# Patient Record
Sex: MALE | Race: WHITE
[De-identification: names, ages, dates, MRNs, and addresses within clinical notes are randomized per-mention and may not be internally consistent; named-entity substitution may affect disease eponyms.]

---

## 2018-12-06 NOTE — DIAGNOSTIC IMAGING REPORT
EXAMINATION:  CHEST SINGLE (PORTABLE)    



INDICATION: Tachycardia.



COMPARISON:  11/1/09..

     

FINDINGS:

TUBES and LINES:  Right chest Port-A-Cath with distal tip projected on the

cavoatrial junction.



LUNGS:  Lungs are hypoinflated. Patchy density in the lower lobe suggestive of

subsegmental atelectasis. Mild prominence of the pulmonary vasculature

bilaterally.



PLEURA:  No pleural effusion or pneumothorax.



HEART AND MEDIASTINUM:  The cardiac silhouette is mildly enlarged.



BONES AND SOFT TISSUES: Status post bilateral shoulder arthroplasty.



UPPER ABDOMEN: No free air under the diaphragm. 



IMPRESSION: 



Bibasilar subsegmental atelectasis.



Cardiomegaly and mild pulmonary venous congestion.











Signed by: Dr. NINA Kelly M.D. on 12/6/2018 6:48 PM

## 2018-12-06 NOTE — XMS REPORT
Patient Summary Document

                             Created on: 2018



INDIA DELACRUZ

External Reference #: [id number]

: 1940

Sex: Male



Demographics







                          Address                   [address]

[place]  [address]

 

                          Home Phone                [phone]

 

                          Preferred Language        Unknown

 

                          Marital Status            Unknown

 

                          Synagogue Affiliation     Unknown

 

                          Race                      Unknown

 

                          Ethnic Group              Unknown





Author







                          Author                    [facility]

 

                          [facility]

 

                          Address                   Unknown

 

                          Phone                     Unavailable







Support







                Name            Relationship    Address         Phone

 

                    NUBIA  CRISTI    PRS                 [address]

[place]  [address]                      [phone]

 

                    CRISTI DELACRUZ    PRS                 [address]

[place]  [address]                      [phone]







Care Team Providers







                    Care Team Member Name    Role                Phone

 

                          Unavailable               Unavailable







Payers







             Payer Name    Policy Type    Policy Number    Effective Date    Expiration Date







Problems

This patient has no known problems.



Allergies, Adverse Reactions, Alerts







          Allergy Name    Allergy Type    Status    Severity    Reaction(s)    Onset Date    Inactive 

Date                      Treating Clinician        Comments

 

        Sulfa (Sulfonamide Antibiotics)    DA      Active                   2018-10-19 00:00:00                     

 

        clindamycin    DA      Active                  2018-10-19 00:00:00                     

 

        Sulfa (Sulfonamide Antibiotics)    DA      Active    U               2018-10-02 00:00:00                     

 

        clindamycin    DA      Active    SV              2018-01-15 00:00:00                     

 

        Sulfa (Sulfonamide Antibiotics)    DA      Active    U               2018-01-15 00:00:00                     

 

        zolpidem    DA      Active    SV              2018-01-15 00:00:00                     







Medications

This patient has no known medications.

## 2018-12-07 NOTE — CONSULTATION
DATE OF CONSULTATION:  December 06, 2018 



REASON FOR CONSULTATION:  AFib. 



HPI:  This is a pleasant 78-year-old male that presented with palpitations. 

 According to the patient, she started having rapid heartbeat in the 130s 

that he decided to come into the emergency room for evaluation.  He stated 

he has been compliant with his medications, avoided caffeine and does not 

really know what is going on.  He has a history of AFib and hypothyroidism. 

 He was recently admitted and discharged from [facility] in October 

after having a left shoulder surgery, and went to rehab and recovered real 

good, and was discharged home.  He denied any chest pain, any dizziness, 

any shortness of breath, or diaphoresis.  Troponin was negative.  EKG with 

AFib with RVR.  .  TSH 4.9.  He was started on an amiodarone drip 

from the emergency room, and heart rate is controlled now, but fluctuates.



PAST MEDICAL HISTORY:  Hypertension, right leg DVT, history of prostate 

cancer, history of cancer of sarcoma, rheumatoid arthritis, CKD, anemia, 

and diabetes.  



SURGICAL HISTORY:  Cholecystectomy, prostate surgery, left and right 

shoulder surgery.



FAMILY HISTORY:  Noncontributory.  



SOCIAL HISTORY:  He lives at home with the wife.  No smoking.  No drinking.



MEDICATIONS:  See med list.  



ALLERGIES:  HE HAS MULTIPLE ALLERGIES.  SEE CHART.



REVIEW OF SYSTEMS:  Negative except as mentioned above.  





PHYSICAL EXAMINATION 

VITAL SIGNS:  Temperature 98, heart rate 81, blood pressure 118/87, 

respirations 16, oxygen saturation 95% on room air. 

GENERAL:  He is awake, alert and oriented times 3. 

HEENT:  Mucous membrane moist. 

NECK:  Supple.  

LUNGS:  Bilateral clear to auscultation. 

CARDIOVASCULAR:  Irregularly irregular. 

ABDOMEN:   Soft. 

NEUROLOGICAL:  Intact. 

EXTREMITIES:  With no edema. 



LABS:  Sodium 137, potassium 4.1, chloride 108, CO2 20, BUN 26, creatinine 

1.67, glucose 91.  White blood cells 5.91, hemoglobin 10.6, hematocrit 

32.4, and platelets 218,0008.  PT 12.9, PTT 27.3 and INR 0.89.  



IMPRESSION 

1.  Atrial fibrillation. 

2.  Hypothyroidism.

3.  Anemia.

4.  Chronic kidney disease. 

5.  History of prostate cancer.

6.  Recent left shoulder surgery.  



ASSESSMENT AND PLAN

1.  He was started on an amiodarone drip.  Will continue the same and 

switch to p.o. tonight.  

2.  He was at [facility] last month and had a recent echo with 

reserved systolic function with EF 50% to 55%.  Will continue his home 

medications.  



Further cardiac workup pending clinical course.  



Thank you for this consultation.  He was anticoagulated and will continue 

the same also.  



DICTATED BY LAXMI DE LA ROSA NP





 





DD:  12/07/2018 08:11

DT:  12/07/2018 09:27

Job#:  [id number]

## 2018-12-07 NOTE — NUR
patient transferred to [facility] in stable condition. The receiving nurse Yumiko will continue the 
care of the patient. Patient was alert and oriented and vitals signs were: /86, HR98, 
RR18, 02 100% RA.

## 2018-12-07 NOTE — NUR
RECEIVED REPORT FROM SAMI RICK, PT TRANSFERRED TO  #188, PT AAOX3, BREATHING EVEN AND 
UNLABORED.PT MADE COMFORTABLE IN ROOM,

## 2018-12-07 NOTE — HISTORY AND PHYSICAL
CHIEF COMPLAINT:  Rapid heartbeat and shortness of breath.



HISTORY OF PRESENT ILLNESS:  This is a 78-year-old male with a past medical 

history of carcinoma of prostate, hypertension, DVT of the leg, recent left 

hip surgery, severe rheumatoid arthritis on prednisone 5 mg daily, who was 

in his usual state of health until the last 2 days he started developing 

some palpitation and increased blood pressure. Patient came to ER. ER shows 

AFib with rapid ventricular response at 121 per minute. No chest pain but 

has some shortness of breath. No bleeding per rectum, no hematemesis, no 

melena. No leg pain. Has left hip pain.



No backache. No burning on urination. No seizures. No focal weakness. No 

diarrhea, no constipation. 



PAST MEDICAL HISTORY

1. Hypertension.

2. Carcinoma of prostate.

3. DVT of lower extremity, chronic.

4. Severe rheumatoid arthritis.



PAST SURGICAL HISTORY:  History of left hip replacement.



ALLERGY:  ALLERGIC TO SULFA AND CLINDAMYCIN.



SOCIAL HISTORY:  Patient , lives with his wife.



HABITS:  Denies smoking, denies alcohol use, denies illicit drug use.



MEDICATION:  List attached.



REVIEW OF SYSTEMS

CONSTITUTIONAL:  Generalized fatigue and weakness.

HEENT:  No diplopia. No blurring of vision.

CARDIOPULMONARY:  No chest pain. Has some palpitation and shortness of 

breath.

ALIMENTARY:  No nausea, no vomiting, no diarrhea, no constipation.

GENITOURINARY:  Has no dysuria, no hematuria.

MUSCULOSKELETAL:  No joint pains.

CENTRAL NERVOUS SYSTEM:  No focal weakness.



PHYSICAL EXAMINATION

GENERAL:  A 78-year-old male who is alert, oriented x3. No gross or acute 

respiratory distress.

VITAL SIGNS:  Temperature 98.2, pulse rate 110 per minute, respiratory rate 

18, blood pressure 104/77.

HEENT:  Head atraumatic, normocephalic. Pupils are bilaterally equal and 

reactive to light.  Extraocular muscles intact.

NECK:  Supple. No JVD. No carotid bruit. 

SKIN:  Dry.

LUNGS:  Clear to auscultation and percussion bilaterally. No added sound.

HEART:  S1 and S2. Irregularly irregular. No S3, no S4, no murmur.

ABDOMEN:  Soft, nontender. No guarding, no rigidity.

EXTREMITIES:  No pedal edema. Peripheral pulses +1.

MUSCULOSKELETAL:  Has joint deformity seen.

CENTRAL NERVOUS SYSTEM:  Grossly nonfocal. 



Chest x-ray:  Cardiomegaly, mild congestion. White count 8.23, hemoglobin 

11.9, hematocrit 38.1, platelet 260. Chemistry:  Sodium 138, potassium 4.3, 

BUN 30, creatinine 2.03. LFTs:  AST 48. TSH 4.972. . CK, CK-MB, 

troponin negative. EKG:  AFib with new AFib with a rapid ventricular 

response at 121 per minute.



ASSESSMENT

1. Atrial fibrillation with rapid ventricular response.

2. Mild congestive heart failure.

3. Hypertension.

4. Chronic deep vein thrombosis.

5. History of rheumatoid arthritis.

6. Left hip replacement.



PLAN:   Admit to ICU. IV amiodarone drip. Cardiology consult, Dr. Thompson. 

Continue amlodipine 5 daily, metoprolol 25 b.i.d. Case discussed with Dr. Thompson. Continue Eliquis 2.5 b.i.d. Lab tomorrow, BMP. 











DD:  12/07/2018 16:36

DT:  12/07/2018 16:48

Job#:  [id number]

## 2018-12-08 NOTE — NUR
Nutrition Screen Note



RD Recommendation for Physician: Continue diet as ordered 



Plan of Care: RD following, monitoring for adequacy and tolerance



Nutrition reason for involvement:

MD Consult



Primary Diagnose(s): atrial fibrillation with RVR 



Ht: 62in 

Wt:130.44lbs

BMI:23.9 kg/m2

IBW:118lbs



RD Assessment:(12/8/2018)

Initial encounter with patient. Diet Hx: Pt has no known food allergies. Medications: MAR 
reviewed. Physical activity and function: pt is able to feed himself. Nutrition - focused 
physical findings: no N, V, D, some biting chewing difficulty with beef, but does not desire 
a texture modification. Pt avoids eating eggs.

Current Diet: cardiac diet 



Malnutrition Evaluation (12/8/2018)

The patient does not meet criteria for a specified degree of malnutrition at this time. Will 
re-evaluate at follow-up as appropriate. 







Diet Education Needs Assessment:

Diet education not indicated.







Diet Adequacy:

Meeting calorie needs, Meeting protein needs, Meeting fluid needs



Tolerance:  

Tolerating PO



Nutrition Care Level: Rj Pabon RD, LD, [facility]

## 2018-12-08 NOTE — NUR
NO CHANGE IN PT'S STATUS, PT RESTING IN BED WITH EYES CLOSED BREATHING EVEN AND UNLABORED. 
WILL REPORT OFF TO COLTEN RICK.

## 2018-12-08 NOTE — NUR
REPORT GIVEN BY COLTEN RICK, BOTH NURSES GAVE REPORT AT PT'S BEDSIDE. PT SITTING UP ON THE SIDE 
OF THE BED, STATES HE'S NAUSEATED, INFORMED HIM THIS NURSE WILL MEDICATE HIM WITH PRN 
ZOFRAN. PT STATES OK.

## 2018-12-09 NOTE — NUR
just transferred patient to  103, nurse Angeline RICK received the reports. he is stable, 
awake alert oriented, telemetry no 2402 is on. vitals just checked prior transferred;within 
normal level.

## 2018-12-09 NOTE — NUR
pt nauseated and vomiting, medicated with zofran 4mg prn dose, pt given cold cloth for his 
head, and informed pt nurse will be back to check on him, pt verbalized understanding, call 
bell in reach.

## 2018-12-09 NOTE — NUR
PT RESTING IN BED, MEDICATED WITH NORCO 10MG FOR CHRONIC PAIN, INSTRUCTED PT TO RELAX, PT 
STATED HE WOULD TRY.INFORMED HIM NURSE WILL CHECK ON HIM AND PLEASE NOTIFY NURSE IF THERES 
ANYTHING I COULD DO FOR HIM

## 2018-12-09 NOTE — NUR
patient resting with closed eyes, just medicated with Norco for back pain and Zofran for 
nausea earlier, vitals checked, no distress noted, bed alarm is on will continue monitoring.

## 2018-12-09 NOTE — NUR
Patient ok to go to floor from Cardiology stand point per Dr. THU Valdes. RN spoke to Dr. SUJATA Koch and obtained floor orders with telemetry.

## 2018-12-09 NOTE — NUR
Complete bed bath given, pt tolerated well, medicated for generalized pain. call bell in 
reach, instructed pt to call when necessary. pt verbalized understanding

## 2018-12-10 NOTE — NUR
spoke with md elena regarding discharge . md miranda discharge , reviewed bp meds at this time 
(adjusting medication orders ) given, 

md perez rounded states pt can go home when pt family brings bottles of medicine to show 
nurse prior to dc. orders received

## 2018-12-10 NOTE — NUR
RECEIVED PATIENT AAOX3, RESTING IN BED. STABLE CONDITION. DENIES ANY NEEDS. BED LOCKED AND 
IN LOWEST POSITION. CALL LIGHT WITHIN EASY REACH. WILL CONTINUE TO MONITOR THE PATIENT 
CLOSELY.

## 2018-12-11 NOTE — NUR
pt sitting up at edge of bed aa0x3. pt is in no s.s of distress. pt is sl to the L fa 22. 
site is clean and dry. pt aware of stool sample collection for today. will mg orr 
for plans in terms of EGD procedure planned

## 2018-12-11 NOTE — NUR
consent for EGD signed and placed to chart. patient education printouts provided to patient. 
bed locked, and in lowest position, call light within easy reach, and bed alarm activated.

## 2018-12-12 NOTE — NUR
WALKING ROUNDS PERFORMED, RECEIVED PT LAYING SEMI FOWLERS IN BED, AAOX3, RR EVEN AND 
NON-LABORED, ON RA. NO S/SX OF DISTRESS NOTED. LEFT PT LAYING SEMI FOWLERS IN BED, BED IN 
LOW LOCKED POSITION, SIDE RAILS UPX2, CALL LIGHT AND PHONE WITHIN REACH.

## 2018-12-12 NOTE — NUR
Nutrition Screen Note

RD Recommendation for Physician:

-Continue cardiac diet as ordered

-Consider Ensure Compact BID if PO <50%   



Plan of Care: RD following, monitoring for adequacy and tolerance



Nutrition reason for involvement:

Follow up 



Primary Diagnose(s): atrial fibrillation with RVR 



PMHx: carcinoma of prostate, hypertension, DVT of the leg, recent left hip surgery, severe 
rheumatoid arthritis



Ht: 62in 

Wt: 130.44lbs  12/6; 116.12lb  12/10 (possible inaccurate weight obtained)     

BMI:23.9kg/m2

IBW: 118lbs     



RD Assessment:

12/12  Chart reviewed.  Pt had colonoscopy today with some polyps removed. During my visit, 
pt reports improved appetite with ~% recorded PO intake. No GI complains noted. LBM  
12/11, black stool per RN. GI is following. Pt has had some weight loss last year but weight 
remains stable this year at 123lb. No swallowing difficulty noted. Will continue to monitor 
and follow. 

(12/8/2018) Initial encounter with patient. Diet Hx: Pt has no known food allergies. 
Medications: MAR reviewed. Physical activity and function: pt is able to feed himself. 
Nutrition - focused physical findings: no N, V, D, some biting chewing difficulty with beef, 
but does not desire a texture modification. Pt avoids eating eggs.



Current Diet: cardiac diet 



Malnutrition Evaluation (12/8/2018)

The patient does not meet criteria for a specified degree of malnutrition at this time. Will 
re-evaluate at follow-up as appropriate. 



Diet Education Needs Assessment:

Diet education not indicated.



Diet Adequacy:

Meeting calorie needs, Meeting protein needs, Meeting fluid needs



Tolerance:  

Tolerating PO



Nutrition Care Level: Low 



Signed by Angeline Troy, MS, RD, LD

## 2018-12-12 NOTE — NUR
MEDICATED PER MD ORDER FOR PAIN 8/10 BACK, TOLERATING PO AT THIS TIME, EDUCATED TO CALL 
BEFORE GETTING OUT OF BED, PT VERBALIZED UNDERSTANDING, CALL LIGHT WITHIN REACH

## 2018-12-12 NOTE — OPERATIVE REPORT
DATE OF PROCEDURE:  December 12, 2018 



REFERRING PHYSICIAN:  Dr. SUJATA Acosta.  



PROCEDURE PERFORMED:  Esophagogastroduodenoscopy.  



INDICATIONS FOR PROCEDURE:  History of melena, nausea and vomiting.



MEDICATION:  Patient was done under MAC.  Please see anesthesiologist's 

note.



PROCEDURE:  With patient in left lateral decubitus position, the flexible 

fiberoptic Olympus gastroscope was introduced into the esophagus under 

direct visualization without any difficulty.  There was some patchy 

erythema noted in distal esophagus.  The scope was then advanced with ease 

into the stomach and the patient appears to be status post Billroth II.  

Anastomosis was intact and both efferent and afferent loops.  Gastric stump 

polyps, somewhat large, x2 were removed per snare electrocautery.  There 

was also some patchy intense erythema noted in the gastric stump and 

biopsies were obtained.  The scope was then retroflexed into the gastric 

stump and the fundus and the cardia appeared to be within normal limits.  

The scope was then straightened out, was subsequently withdrawn.  Patient 

tolerated the procedure well.



IMPRESSIONS  

1. Mild distal esophagitis.

2. Status post Billroth II, anastomosis intact.



3. Large gastric stump polyps x2, removed per snare electrocautery.

4. Gastric stump gastritis, biopsied.



PLAN:  Follow up histology.  Add Carafate 1 g p.o. a.c. t.i.d. and nightly. 

 







DD:  12/12/2018 12:14

DT:  12/12/2018 12:30

Job#:  [id number]



cc:ZENA ACOSTA MD, [phone]

## 2018-12-13 NOTE — NUR
Walking rounds completed and th pt. is currently sleeping. The siderails are up times two 
and alarms are activated.

## 2019-09-16 LAB
ANION GAP SERPL CALC-SCNC: 12.8 MMOL/L (ref 8–16)
BASOPHILS # BLD AUTO: 0 10*3/UL (ref 0–0.1)
BASOPHILS NFR BLD AUTO: 0.5 % (ref 0–1)
BUN SERPL-MCNC: 32 MG/DL (ref 7–26)
BUN/CREAT SERPL: 17 (ref 6–25)
CALCIUM SERPL-MCNC: 8.8 MG/DL (ref 8.4–10.2)
CHLORIDE SERPL-SCNC: 101 MMOL/L (ref 98–107)
CO2 SERPL-SCNC: 27 MMOL/L (ref 22–29)
DEPRECATED NEUTROPHILS # BLD AUTO: 5.8 10*3/UL (ref 2.1–6.9)
EGFRCR SERPLBLD CKD-EPI 2021: 35 ML/MIN (ref 60–?)
EOSINOPHIL # BLD AUTO: 0.2 10*3/UL (ref 0–0.4)
EOSINOPHIL NFR BLD AUTO: 2.1 % (ref 0–6)
ERYTHROCYTE [DISTWIDTH] IN CORD BLOOD: 15 % (ref 11.7–14.4)
GLUCOSE SERPLBLD-MCNC: 112 MG/DL (ref 74–118)
HCT VFR BLD AUTO: 31.7 % (ref 38.2–49.6)
HGB BLD-MCNC: 9.5 G/DL (ref 14–18)
LYMPHOCYTES # BLD: 1.3 10*3/UL (ref 1–3.2)
LYMPHOCYTES NFR BLD AUTO: 16.2 % (ref 18–39.1)
MCH RBC QN AUTO: 24.7 PG (ref 28–32)
MCHC RBC AUTO-ENTMCNC: 30 G/DL (ref 31–35)
MCV RBC AUTO: 82.6 FL (ref 81–99)
MONOCYTES # BLD AUTO: 0.8 10*3/UL (ref 0.2–0.8)
MONOCYTES NFR BLD AUTO: 10.3 % (ref 4.4–11.3)
NEUTS SEG NFR BLD AUTO: 70.5 % (ref 38.7–80)
PLATELET # BLD AUTO: 336 X10E3/UL (ref 140–360)
POTASSIUM SERPL-SCNC: 3.8 MMOL/L (ref 3.5–5.1)
RBC # BLD AUTO: 3.84 X10E6/UL (ref 4.3–5.7)
SODIUM SERPL-SCNC: 137 MMOL/L (ref 136–145)

## 2019-09-16 NOTE — DIAGNOSTIC IMAGING REPORT
EXAMINATION:  CHEST 2 VIEWS    



INDICATION: Pre-operative



COMPARISON: Chest radiograph of 12/6/2018

     

FINDINGS:



LINES/TUBES:Right chest subclavian port with catheter tip in the superior vena

cava.



LUNGS:The lungs are well-inflated. No focal consolidation or pulmonary edema.

Mild left basilar subsegmental atelectasis.



PLEURA:No pleural effusion or pneumothorax.



MEDIASTINUM:The cardiomediastinal silhouette appears normal in size and shape.



BONES/SOFT TISSUES:No acute osseous injury. Status post right and left total

shoulder replacements. Diffuse osteopenia of the spine.



ABDOMEN:No free air under the diaphragm. Status post cholecystectomy.





IMPRESSION: 

No focal pneumonia or pulmonary edema.



Signed by: Destiney Rivera MD on 9/16/2019 11:46 AM

## 2019-09-18 ENCOUNTER — HOSPITAL ENCOUNTER (OUTPATIENT)
Dept: HOSPITAL 88 - OR | Age: 79
Discharge: HOME | End: 2019-09-18
Attending: SURGERY
Payer: MEDICARE

## 2019-09-18 VITALS — SYSTOLIC BLOOD PRESSURE: 115 MMHG | DIASTOLIC BLOOD PRESSURE: 90 MMHG

## 2019-09-18 DIAGNOSIS — Z01.818: ICD-10-CM

## 2019-09-18 DIAGNOSIS — C61: ICD-10-CM

## 2019-09-18 DIAGNOSIS — Z88.2: ICD-10-CM

## 2019-09-18 DIAGNOSIS — Z01.810: ICD-10-CM

## 2019-09-18 DIAGNOSIS — Z79.02: ICD-10-CM

## 2019-09-18 DIAGNOSIS — C44.40: Primary | ICD-10-CM

## 2019-09-18 DIAGNOSIS — Z88.1: ICD-10-CM

## 2019-09-18 DIAGNOSIS — Z86.73: ICD-10-CM

## 2019-09-18 DIAGNOSIS — I48.91: ICD-10-CM

## 2019-09-18 DIAGNOSIS — Z01.812: ICD-10-CM

## 2019-09-18 DIAGNOSIS — Z86.718: ICD-10-CM

## 2019-09-18 DIAGNOSIS — L98.491: ICD-10-CM

## 2019-09-18 DIAGNOSIS — I10: ICD-10-CM

## 2019-09-18 DIAGNOSIS — M06.9: ICD-10-CM

## 2019-09-18 PROCEDURE — 80048 BASIC METABOLIC PNL TOTAL CA: CPT

## 2019-09-18 PROCEDURE — 88307 TISSUE EXAM BY PATHOLOGIST: CPT

## 2019-09-18 PROCEDURE — 85025 COMPLETE CBC W/AUTO DIFF WBC: CPT

## 2019-09-18 PROCEDURE — 36415 COLL VENOUS BLD VENIPUNCTURE: CPT

## 2019-09-18 PROCEDURE — 71046 X-RAY EXAM CHEST 2 VIEWS: CPT

## 2019-09-18 PROCEDURE — 93005 ELECTROCARDIOGRAM TRACING: CPT

## 2019-09-18 PROCEDURE — 14020 TIS TRNFR S/A/L 10 SQ CM/<: CPT

## 2019-09-18 NOTE — OPERATIVE REPORT
DATE OF PROCEDURE:  09/18/2019

 

SURGEON:  Juancarlos Longo MD

 

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

 

OPERATION PERFORMED:  Wide excision of ulcerated cancer of the left parietal scalp with

rotational flap closure. 

 

ANESTHESIA:  General.

 

COMPLICATIONS:  None.

 

ESTIMATED BLOOD LOSS:  25 mL.

 

DESCRIPTION OF PROCEDURE:  With the patient lying in bed in the supine position, under

good general endotracheal anesthesia, the scalp and forehead were prepped with Betadine

solution and draped in the usual manner.  The patient had a lesion that was about 3 cm x

3 cm in size with part of it ulcerated.  A wide elliptical incision was then made to

include the entire lesion with normal appearing skin all the way around.  Incision was

deepened through the subcutaneous tissue and all the way down to the bone and the lesion

was totally and completely removed, and sent for pathological examination after

appropriate marking.  Hemostasis was then ascertained.  The whole area was then

infiltrated with 0.25% Marcaine with epinephrine.  Wide flaps then had to be developed

all the way down to the ear on the one side and to the lateral aspect of the scalp on

the right side, and all the way around in order to be able to mobilize enough skin.  The

skin did reach, although with some tension and the wound was then closed with

interrupted vertical mattress sutures of 2-0 nylon.  Pressure dressing was applied.  The

sponge, lap, and needle counts were correct.  The patient tolerated the procedure well

and returned to the recovery room in stable condition. 

 

 

 

 

______________________________

MD ANANTH Meehan/MODL

D:  09/18/2019 12:22:13

T:  09/18/2019 13:14:25

Job #:  [id number]

## 2019-09-26 ENCOUNTER — HOSPITAL ENCOUNTER (INPATIENT)
Dept: HOSPITAL 88 - IMCU | Age: 79
LOS: 7 days | Discharge: HOME HEALTH | DRG: 193 | End: 2019-10-03
Attending: INTERNAL MEDICINE | Admitting: INTERNAL MEDICINE
Payer: MEDICARE

## 2019-09-26 VITALS — DIASTOLIC BLOOD PRESSURE: 89 MMHG | SYSTOLIC BLOOD PRESSURE: 117 MMHG

## 2019-09-26 VITALS — SYSTOLIC BLOOD PRESSURE: 116 MMHG | DIASTOLIC BLOOD PRESSURE: 98 MMHG

## 2019-09-26 VITALS — DIASTOLIC BLOOD PRESSURE: 101 MMHG | SYSTOLIC BLOOD PRESSURE: 120 MMHG

## 2019-09-26 VITALS — HEIGHT: 62 IN | BODY MASS INDEX: 22.52 KG/M2 | WEIGHT: 122.37 LBS

## 2019-09-26 VITALS — DIASTOLIC BLOOD PRESSURE: 98 MMHG | SYSTOLIC BLOOD PRESSURE: 116 MMHG

## 2019-09-26 VITALS — SYSTOLIC BLOOD PRESSURE: 134 MMHG | DIASTOLIC BLOOD PRESSURE: 99 MMHG

## 2019-09-26 VITALS — DIASTOLIC BLOOD PRESSURE: 80 MMHG | SYSTOLIC BLOOD PRESSURE: 119 MMHG

## 2019-09-26 VITALS — DIASTOLIC BLOOD PRESSURE: 71 MMHG | SYSTOLIC BLOOD PRESSURE: 95 MMHG

## 2019-09-26 VITALS — SYSTOLIC BLOOD PRESSURE: 125 MMHG | DIASTOLIC BLOOD PRESSURE: 95 MMHG

## 2019-09-26 VITALS — DIASTOLIC BLOOD PRESSURE: 88 MMHG | SYSTOLIC BLOOD PRESSURE: 134 MMHG

## 2019-09-26 VITALS — DIASTOLIC BLOOD PRESSURE: 94 MMHG | SYSTOLIC BLOOD PRESSURE: 111 MMHG

## 2019-09-26 VITALS — DIASTOLIC BLOOD PRESSURE: 96 MMHG | SYSTOLIC BLOOD PRESSURE: 120 MMHG

## 2019-09-26 VITALS — SYSTOLIC BLOOD PRESSURE: 120 MMHG | DIASTOLIC BLOOD PRESSURE: 102 MMHG

## 2019-09-26 DIAGNOSIS — M06.9: ICD-10-CM

## 2019-09-26 DIAGNOSIS — J96.01: ICD-10-CM

## 2019-09-26 DIAGNOSIS — E77.8: ICD-10-CM

## 2019-09-26 DIAGNOSIS — G89.4: ICD-10-CM

## 2019-09-26 DIAGNOSIS — I48.91: ICD-10-CM

## 2019-09-26 DIAGNOSIS — I13.0: ICD-10-CM

## 2019-09-26 DIAGNOSIS — Z79.01: ICD-10-CM

## 2019-09-26 DIAGNOSIS — D50.9: ICD-10-CM

## 2019-09-26 DIAGNOSIS — N18.9: ICD-10-CM

## 2019-09-26 DIAGNOSIS — J18.9: Primary | ICD-10-CM

## 2019-09-26 DIAGNOSIS — I82.503: ICD-10-CM

## 2019-09-26 DIAGNOSIS — E87.1: ICD-10-CM

## 2019-09-26 DIAGNOSIS — I50.42: ICD-10-CM

## 2019-09-26 LAB
ALBUMIN SERPL-MCNC: 3.3 G/DL (ref 3.5–5)
ALBUMIN/GLOB SERPL: 1.4 {RATIO} (ref 0.8–2)
ALP SERPL-CCNC: 99 IU/L (ref 40–150)
ALT SERPL-CCNC: 22 IU/L (ref 0–55)
ANION GAP SERPL CALC-SCNC: 12.4 MMOL/L (ref 8–16)
BASOPHILS # BLD AUTO: 0 10*3/UL (ref 0–0.1)
BASOPHILS NFR BLD AUTO: 0.5 % (ref 0–1)
BUN SERPL-MCNC: 33 MG/DL (ref 7–26)
BUN/CREAT SERPL: 16 (ref 6–25)
CALCIUM SERPL-MCNC: 8.7 MG/DL (ref 8.4–10.2)
CHLORIDE SERPL-SCNC: 94 MMOL/L (ref 98–107)
CO2 SERPL-SCNC: 29 MMOL/L (ref 22–29)
DEPRECATED NEUTROPHILS # BLD AUTO: 6.6 10*3/UL (ref 2.1–6.9)
EGFRCR SERPLBLD CKD-EPI 2021: 32 ML/MIN (ref 60–?)
EOSINOPHIL # BLD AUTO: 0 10*3/UL (ref 0–0.4)
EOSINOPHIL NFR BLD AUTO: 0.5 % (ref 0–6)
ERYTHROCYTE [DISTWIDTH] IN CORD BLOOD: 15.2 % (ref 11.7–14.4)
GLOBULIN PLAS-MCNC: 2.4 G/DL (ref 2.3–3.5)
GLUCOSE SERPLBLD-MCNC: 140 MG/DL (ref 74–118)
HCT VFR BLD AUTO: 29.8 % (ref 38.2–49.6)
HGB BLD-MCNC: 8.8 G/DL (ref 14–18)
LYMPHOCYTES # BLD: 0.8 10*3/UL (ref 1–3.2)
LYMPHOCYTES NFR BLD AUTO: 9.8 % (ref 18–39.1)
MAGNESIUM SERPL-MCNC: 2.3 MG/DL (ref 1.3–2.1)
MCH RBC QN AUTO: 23.8 PG (ref 28–32)
MCHC RBC AUTO-ENTMCNC: 29.5 G/DL (ref 31–35)
MCV RBC AUTO: 80.8 FL (ref 81–99)
MONOCYTES # BLD AUTO: 0.7 10*3/UL (ref 0.2–0.8)
MONOCYTES NFR BLD AUTO: 8.1 % (ref 4.4–11.3)
NEUTS SEG NFR BLD AUTO: 80.9 % (ref 38.7–80)
PLATELET # BLD AUTO: 299 X10E3/UL (ref 140–360)
POTASSIUM SERPL-SCNC: 3.4 MMOL/L (ref 3.5–5.1)
RBC # BLD AUTO: 3.69 X10E6/UL (ref 4.3–5.7)
SODIUM SERPL-SCNC: 132 MMOL/L (ref 136–145)

## 2019-09-26 PROCEDURE — 71045 X-RAY EXAM CHEST 1 VIEW: CPT

## 2019-09-26 PROCEDURE — 85025 COMPLETE CBC W/AUTO DIFF WBC: CPT

## 2019-09-26 PROCEDURE — 97139 UNLISTED THERAPEUTIC PX: CPT

## 2019-09-26 PROCEDURE — 93312 ECHO TRANSESOPHAGEAL: CPT

## 2019-09-26 PROCEDURE — 93325 DOPPLER ECHO COLOR FLOW MAPG: CPT

## 2019-09-26 PROCEDURE — 71046 X-RAY EXAM CHEST 2 VIEWS: CPT

## 2019-09-26 PROCEDURE — 36415 COLL VENOUS BLD VENIPUNCTURE: CPT

## 2019-09-26 PROCEDURE — 82805 BLOOD GASES W/O2 SATURATION: CPT

## 2019-09-26 PROCEDURE — 93320 DOPPLER ECHO COMPLETE: CPT

## 2019-09-26 PROCEDURE — 83880 ASSAY OF NATRIURETIC PEPTIDE: CPT

## 2019-09-26 PROCEDURE — 93005 ELECTROCARDIOGRAM TRACING: CPT

## 2019-09-26 PROCEDURE — 80053 COMPREHEN METABOLIC PANEL: CPT

## 2019-09-26 PROCEDURE — 84484 ASSAY OF TROPONIN QUANT: CPT

## 2019-09-26 PROCEDURE — 36600 WITHDRAWAL OF ARTERIAL BLOOD: CPT

## 2019-09-26 PROCEDURE — 71250 CT THORAX DX C-: CPT

## 2019-09-26 PROCEDURE — 83735 ASSAY OF MAGNESIUM: CPT

## 2019-09-26 PROCEDURE — 80048 BASIC METABOLIC PNL TOTAL CA: CPT

## 2019-09-26 RX ADMIN — CEFTRIAXONE SCH MLS/HR: 2 INJECTION, SOLUTION INTRAVENOUS at 22:29

## 2019-09-26 RX ADMIN — METOPROLOL TARTRATE SCH MG: 25 TABLET, FILM COATED ORAL at 20:43

## 2019-09-26 RX ADMIN — SODIUM CHLORIDE SCH MLS/HR: 900 INJECTION, SOLUTION INTRAVENOUS at 21:10

## 2019-09-26 RX ADMIN — HYDROCODONE BITARTRATE AND ACETAMINOPHEN PRN EA: 10; 325 TABLET ORAL at 19:50

## 2019-09-26 RX ADMIN — FAMOTIDINE SCH MG: 10 INJECTION, SOLUTION INTRAVENOUS at 21:10

## 2019-09-26 RX ADMIN — APIXABAN SCH MG: 5 TABLET, FILM COATED ORAL at 17:14

## 2019-09-26 NOTE — DIAGNOSTIC IMAGING REPORT
A single frontal view of the chest.



HISTORY:  Shortness of breath, A. fib

COMPARISON:  Chest radiograph September 16, 2019.

     

DISCUSSION:

Portable technique,  limits sensitivity of the exam.

Multiple overlying artifacts.

Tubes/Lines:  Unchanged appearance of the right chest port.



Lungs and pleura:  

Low lung volumes result in bibasilar vascular crowding, accentuation of the

pulmonary interstitial markings, central pulmonary vasculature, and the cardiac

silhouette.  Allowing for these limitations, the findings are as follows:  

Mild patchy retrocardiac opacity.

No definite pleural effusion or pneumothorax is identified.



Heart and mediastinum:  

The cardiomediastinal silhouette appear(s) unchanged.



Bones and soft tissues:  

Bilateral total shoulder arthroplasties.





IMPRESSION: 

Patchy retrocardiac opacity, may reflect atelectasis, pneumonia, and/or

aspiration in the appropriate setting. Recommend short term follow up routine

PA and lateral chest radiographs, in 6 to 8 weeks, to evaluate for resolution.











Signed by: Dr. Chuy Hopkins D.O., M.M.M. on 9/26/2019 7:36 PM

## 2019-09-27 VITALS — SYSTOLIC BLOOD PRESSURE: 94 MMHG | DIASTOLIC BLOOD PRESSURE: 73 MMHG

## 2019-09-27 VITALS — DIASTOLIC BLOOD PRESSURE: 68 MMHG | SYSTOLIC BLOOD PRESSURE: 92 MMHG

## 2019-09-27 VITALS — SYSTOLIC BLOOD PRESSURE: 94 MMHG | DIASTOLIC BLOOD PRESSURE: 77 MMHG

## 2019-09-27 VITALS — DIASTOLIC BLOOD PRESSURE: 98 MMHG | SYSTOLIC BLOOD PRESSURE: 122 MMHG

## 2019-09-27 VITALS — DIASTOLIC BLOOD PRESSURE: 71 MMHG | SYSTOLIC BLOOD PRESSURE: 95 MMHG

## 2019-09-27 VITALS — SYSTOLIC BLOOD PRESSURE: 104 MMHG | DIASTOLIC BLOOD PRESSURE: 80 MMHG

## 2019-09-27 VITALS — DIASTOLIC BLOOD PRESSURE: 88 MMHG | SYSTOLIC BLOOD PRESSURE: 108 MMHG

## 2019-09-27 VITALS — DIASTOLIC BLOOD PRESSURE: 98 MMHG | SYSTOLIC BLOOD PRESSURE: 116 MMHG

## 2019-09-27 VITALS — DIASTOLIC BLOOD PRESSURE: 69 MMHG | SYSTOLIC BLOOD PRESSURE: 89 MMHG

## 2019-09-27 VITALS — DIASTOLIC BLOOD PRESSURE: 69 MMHG | SYSTOLIC BLOOD PRESSURE: 95 MMHG

## 2019-09-27 VITALS — DIASTOLIC BLOOD PRESSURE: 61 MMHG | SYSTOLIC BLOOD PRESSURE: 97 MMHG

## 2019-09-27 VITALS — SYSTOLIC BLOOD PRESSURE: 101 MMHG | DIASTOLIC BLOOD PRESSURE: 79 MMHG

## 2019-09-27 VITALS — DIASTOLIC BLOOD PRESSURE: 79 MMHG | SYSTOLIC BLOOD PRESSURE: 105 MMHG

## 2019-09-27 VITALS — SYSTOLIC BLOOD PRESSURE: 97 MMHG | DIASTOLIC BLOOD PRESSURE: 72 MMHG

## 2019-09-27 VITALS — SYSTOLIC BLOOD PRESSURE: 100 MMHG | DIASTOLIC BLOOD PRESSURE: 78 MMHG

## 2019-09-27 VITALS — SYSTOLIC BLOOD PRESSURE: 85 MMHG | DIASTOLIC BLOOD PRESSURE: 66 MMHG

## 2019-09-27 VITALS — DIASTOLIC BLOOD PRESSURE: 76 MMHG | SYSTOLIC BLOOD PRESSURE: 103 MMHG

## 2019-09-27 VITALS — DIASTOLIC BLOOD PRESSURE: 86 MMHG | SYSTOLIC BLOOD PRESSURE: 107 MMHG

## 2019-09-27 VITALS — DIASTOLIC BLOOD PRESSURE: 66 MMHG | SYSTOLIC BLOOD PRESSURE: 94 MMHG

## 2019-09-27 VITALS — SYSTOLIC BLOOD PRESSURE: 104 MMHG | DIASTOLIC BLOOD PRESSURE: 78 MMHG

## 2019-09-27 VITALS — DIASTOLIC BLOOD PRESSURE: 76 MMHG | SYSTOLIC BLOOD PRESSURE: 97 MMHG

## 2019-09-27 VITALS — DIASTOLIC BLOOD PRESSURE: 55 MMHG | SYSTOLIC BLOOD PRESSURE: 98 MMHG

## 2019-09-27 VITALS — SYSTOLIC BLOOD PRESSURE: 93 MMHG | DIASTOLIC BLOOD PRESSURE: 69 MMHG

## 2019-09-27 VITALS — DIASTOLIC BLOOD PRESSURE: 84 MMHG | SYSTOLIC BLOOD PRESSURE: 105 MMHG

## 2019-09-27 LAB
ANION GAP SERPL CALC-SCNC: 9.7 MMOL/L (ref 8–16)
BASE EXCESS BLDA CALC-SCNC: 3 MMOL/L (ref -2–3)
BASOPHILS # BLD AUTO: 0.1 10*3/UL (ref 0–0.1)
BASOPHILS NFR BLD AUTO: 0.7 % (ref 0–1)
BUN SERPL-MCNC: 31 MG/DL (ref 7–26)
BUN/CREAT SERPL: 15 (ref 6–25)
CALCIUM SERPL-MCNC: 8.1 MG/DL (ref 8.4–10.2)
CHLORIDE SERPL-SCNC: 96 MMOL/L (ref 98–107)
CO2 SERPL-SCNC: 28 MMOL/L (ref 22–29)
DEPRECATED NEUTROPHILS # BLD AUTO: 4.8 10*3/UL (ref 2.1–6.9)
EGFRCR SERPLBLD CKD-EPI 2021: 31 ML/MIN (ref 60–?)
EOSINOPHIL # BLD AUTO: 0.2 10*3/UL (ref 0–0.4)
EOSINOPHIL NFR BLD AUTO: 2.1 % (ref 0–6)
ERYTHROCYTE [DISTWIDTH] IN CORD BLOOD: 15.2 % (ref 11.7–14.4)
GLUCOSE SERPLBLD-MCNC: 100 MG/DL (ref 74–118)
HCO3 BLDA-SCNC: 27 MMOL/L (ref 23–28)
HCT VFR BLD AUTO: 24.8 % (ref 38.2–49.6)
HGB BLD-MCNC: 7.5 G/DL (ref 14–18)
LYMPHOCYTES # BLD: 1.2 10*3/UL (ref 1–3.2)
LYMPHOCYTES NFR BLD AUTO: 16.5 % (ref 18–39.1)
MCH RBC QN AUTO: 24.3 PG (ref 28–32)
MCHC RBC AUTO-ENTMCNC: 30.2 G/DL (ref 31–35)
MCV RBC AUTO: 80.3 FL (ref 81–99)
MONOCYTES # BLD AUTO: 0.8 10*3/UL (ref 0.2–0.8)
MONOCYTES NFR BLD AUTO: 11.3 % (ref 4.4–11.3)
NEUTS SEG NFR BLD AUTO: 69.1 % (ref 38.7–80)
PCO2 BLDA: 161 MMHG (ref 80–105)
PCO2 BLDA: 38 MMHG (ref 41–51)
PH BLDA: 7.46 [PH] (ref 7.31–7.41)
PLATELET # BLD AUTO: 250 X10E3/UL (ref 140–360)
POTASSIUM SERPL-SCNC: 3.7 MMOL/L (ref 3.5–5.1)
RBC # BLD AUTO: 3.09 X10E6/UL (ref 4.3–5.7)
SAO2 % BLDA: 100 % (ref 95–98)
SODIUM SERPL-SCNC: 130 MMOL/L (ref 136–145)

## 2019-09-27 RX ADMIN — MORPHINE SULFATE PRN MG: 30 TABLET, EXTENDED RELEASE ORAL at 12:42

## 2019-09-27 RX ADMIN — SODIUM CHLORIDE SCH MLS/HR: 900 INJECTION, SOLUTION INTRAVENOUS at 20:59

## 2019-09-27 RX ADMIN — HYDROMORPHONE HYDROCHLORIDE PRN MG: 2 INJECTION INTRAMUSCULAR; INTRAVENOUS; SUBCUTANEOUS at 21:26

## 2019-09-27 RX ADMIN — APIXABAN SCH MG: 5 TABLET, FILM COATED ORAL at 09:22

## 2019-09-27 RX ADMIN — AMIODARONE HYDROCHLORIDE SCH MG: 200 TABLET ORAL at 21:17

## 2019-09-27 RX ADMIN — CEFTRIAXONE SCH MLS/HR: 2 INJECTION, SOLUTION INTRAVENOUS at 21:00

## 2019-09-27 RX ADMIN — METOPROLOL TARTRATE SCH MG: 25 TABLET, FILM COATED ORAL at 15:28

## 2019-09-27 RX ADMIN — FAMOTIDINE SCH MG: 10 INJECTION, SOLUTION INTRAVENOUS at 09:21

## 2019-09-27 RX ADMIN — AMIODARONE HYDROCHLORIDE SCH MG: 200 TABLET ORAL at 09:24

## 2019-09-27 RX ADMIN — HYDROCODONE BITARTRATE AND ACETAMINOPHEN PRN EA: 10; 325 TABLET ORAL at 15:42

## 2019-09-27 RX ADMIN — HYDROCODONE BITARTRATE AND ACETAMINOPHEN PRN EA: 10; 325 TABLET ORAL at 09:25

## 2019-09-27 RX ADMIN — HYDROCODONE BITARTRATE AND ACETAMINOPHEN PRN EA: 10; 325 TABLET ORAL at 03:29

## 2019-09-27 RX ADMIN — METOPROLOL TARTRATE SCH MG: 25 TABLET, FILM COATED ORAL at 03:28

## 2019-09-27 RX ADMIN — METOPROLOL TARTRATE SCH MG: 25 TABLET, FILM COATED ORAL at 09:22

## 2019-09-27 RX ADMIN — FAMOTIDINE SCH MG: 10 INJECTION, SOLUTION INTRAVENOUS at 17:32

## 2019-09-27 RX ADMIN — APIXABAN SCH MG: 5 TABLET, FILM COATED ORAL at 17:17

## 2019-09-27 RX ADMIN — METOPROLOL TARTRATE SCH MG: 25 TABLET, FILM COATED ORAL at 21:00

## 2019-09-27 NOTE — NUR
Dr Nice in with patient.  He orders to give the: eliquis, metoprolol, and start PO 
amiodarone, and continue the IV amiodarone until it completes.  He advises to anticipate 
discharge this afternoon. 

-------------------------------------------------------------------------------

Addendum: 09/28/19 at 0741 by Ciara Jeffrey RN

-------------------------------------------------------------------------------

Patient with systolic blood pressures in the 90's.  Dr ADILSON Koch noted these numbers while 
visiting and instructed to give metoprolol and iv and po amiodarone with these BPs stating 
pt's blood pressure will be ok.

## 2019-09-27 NOTE — CONSULTATION
DATE OF CONSULTATION:  09/27/2019

 

Pulmonary Consultation

 

The patient of Dr. Robbi Koch, Dr. Evelio Koch, Dr. Gann, Dr. Carreon.

 

HISTORY OF PRESENT ILLNESS:  A charming 79-year-old gentleman admitted from doctor's

office with shortness of breath, palpitations, and chest pain.  Found to be in atrial

fibrillation with rapid response.  He has had atrial fibrillation before. 

 

PAST MEDICAL HISTORY:  History of heart failure and DVT, carcinoma of the prostate

treated in the past with radical cryo prostatectomy, radiation and chemotherapy,

required a suprapubic tube at that time.  History of severe rheumatoid arthritis,

history of DVTs in the past. 

 

ALLERGIES:  HE IS ALLERGIC TO SULFUR AND CLEOCIN.

 

MEDICATIONS:  Include Colace, folic acid, Lasix, methotrexate, metoprolol, prednisone 5

mg, and Carafate as well as MS Contin and Eliquis. 

 

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

carcinoma. 

 

SOCIAL HISTORY:  Born in [place].  Worked as a manager.  He says he is losing weight

approximately 40 pounds.  He has been seen earlier today by Dr. Gann. 

 

PHYSICAL EXAMINATION:

GENERAL:  A slight white male, anxious, dyspneic earlier, but comfortable now at rest.   

VITAL SIGNS:  Temperature 97, pulse 82, blood pressure 100/74. 

HEAD:  Normocephalic and atraumatic. 

NECK:  Trachea midline. 

LUNGS:  Bibasilar rales. 

HEART:  Irregular rhythm. 

ABDOMEN:  Nontender. 

EXTREMITIES:  Nonedematous with rheumatoid changes.

IMPRESSION:  

1. Congestive heart failure.

2. Atrial fibrillation with rapid ventricular response.

3. Anemia.

4. Weight loss.

5. History of carcinoma of the prostate.

 

PLAN:  Support hemoglobin falling from 11.3 to 7.5.  Continue anticoagulation as

tolerated.  The patient is a nonsmoker.  We will request chest x-ray 2 views.  Chest

x-ray on the 26th revealed a vague left lower lobe infiltrate.  Requested 2-view x-ray.

Carcinoma apparently was treated in 2007.  There is no pathology report regarding

prostate.  Continue supplemental oxygen.  Cannot completely exclude the possibility of a

nonspecific interstitial pneumonitis, but plan therapy for congestive heart failure at

this time and replacement of iron. 

 

Thank you for this kind referral.

 

 

 

 

______________________________

MD BORIS Box/THOR

D:  09/27/2019 16:20:20

T:  09/27/2019 23:47:36

Job #:  [id number]

## 2019-09-27 NOTE — NUR
Pt was premedicated and then infused dextran initial dose, which has ended now.  No current 
complications noted.

## 2019-09-27 NOTE — DIAGNOSTIC IMAGING REPORT
Chest, 2 views,  9/27/2019.   

 



History: A. Fib.



Comparison: 9/26/2019.



Findings: The cardiomediastinal silhouette and pulmonary vasculature are within

normal limits. There is no evidence of consolidation or pleural effusion. There

is no visible retrocardiac opacity. Right subclavian Port-A-Cath and bilateral

shoulder prostheses are unchanged. There are no acute osseous or soft tissue

abnormalities. 



Impression: 

No acute cardiopulmonary abnormality.



Signed by: Osmani Cuadra on 9/27/2019 4:44 PM

## 2019-09-27 NOTE — NUR
Nutrition Intervention Note



RD Recommendation(s) for Physician: 

- Rec Ensure clear TID with meals

- Consider liberalization of diet due to poor intake

- The patient meets criteria for unspecified SEVERE protein-calorie malnutrition 



Plan of Care: RD following, monitoring for tolerance and adequacy, ONS rec 



Nutrition reason for involvement: Nutrition Risk Trigger  MST 2 



RD Assessment

9/27  78yo M, who was admitted for Afib with RVR and CHF. Pt stated he has been eating <50% 
of meals for > 1 month prior to admission. Pt mentioned he is trying to eat >50% of his 
meals. Pt reported he had lost wt and used to weigh 135 lbs 6 months ago. Per chart, pt had 
consumed 25% of dinner on 9/26. Pt has weights ranging from 110 to 114 lbs in chart for this 
admission. If accurate, this would be a 16-19% wt loss in 6 months  severe wt loss. No 
N/V/D/C reported and no chewing/swallowing issues. Performed NFPE, pt showed moderate  
severe signs of generalized muscle and fat depletion. Pt was interested in Ensure clear- 
will provide with meals. Will continue to monitor. 



Principal Problems/Diagnoses: afib with RVR



PMH: afib, CHF, carcinoma of prostate, HTN, DVT in lower extremities, severe RA, chronic 
pain syndrome 



I/O: +467/-400



GI: flat, soft abdomen



Skin: intact



Labs: (9/27) Na 130, BUN, Creat 2.06, Ca 8.1 



Meds: (9/27) azithromycin, metroprolol, famotidine



Ht: 62 inches

Wt: 110.37 lbs

BMI: 20.2kg/m2

IBW: 118 lbs +/- 10%



Malnutrition Evaluation (9/27)

The patient meets criteria for unspecified SEVERE protein-calorie malnutrition 



Energy intake:

<50% of estimated energy requirements for >1 month



Weight loss:

>10% in 6 months (Chronic)



Fat loss: Moderate: apparent ribs 

Muscle loss: Moderate - severe: temporal depression, squaring of shoulder, depression line 
on the thigh 



Supporting Evidence:

Fluid accumulation: chronic mild edema in extremities per chart

Functional Status: unable to evaluate



Nutrition Prescription (Diet Order): cardiac diet



Estimated Nutritional Needs:

Calories: 0516-7370 kcal (25-35 kcal/kg) Weight used : 110 lbs

Protein: 50-75 g/day (1-1.5 g/kg) Weight used: 110 lbs



Diet Adequacy:

Not meeting calorie needs, Not meeting protein needs



Tolerance:

Tolerating PO 



Diet Education Needs Assessment:

Diet education not indicated



Nutrition Care Level: High



Nutrition Diagnosis: 

Severe protein-calorie malnutrition related to chronic illness as evidenced by pt meeting 
<75% of energy needs for > 1 month, >10% wt loss in 6 months, and moderate-severe muscle and 
fat depletion



Goal: Patient will meet % of estimated needs by follow up 



Progress: N/A 



Interventions:

Commercial beverage, General healthful diet 



Monitoring/Evaluation:

-Total energy intake, Total protein intake, Liquid supplement, Weight change



Signed: Angeline Troy, MS, RD, LD

## 2019-09-27 NOTE — CONSULTATION
DATE OF CONSULTATION:  09/26/2019  

 

CHIEF COMPLAINT:  Chest pain, shortness of breath and palpitation.

 

HISTORY OF PRESENT ILLNESS:  This is a 79-year-old male with a past medical history of

atrial fibrillation, congestive heart failure, carcinoma of the prostate, hypertension,

DVT in lower extremities, severe rheumatoid arthritis, chronic pain syndrome, who in his

usual state of health until patient came to my office today with shortness of breath,

chest pain, and increase in heart rate with a rapid ventricular response of atrial

fibrillation at 150 per minute.  Patient was sent to the Cardiology office.

Cardiologist admitted patient to hospital.  Has bilateral leg pain.  No abdomen pain.

No nausea or vomiting.  No hematemesis.  No melena.  No hematuria.  No cough.  Patient

has chronic joint pain.  No back pain. 

 

PAST MEDICAL HISTORY:  

1. Carcinoma of the prostate.

2. Hypertension.

3. DVT of both lower extremities.

4. Rheumatoid arthritis.

5. Congestive heart failure.

6. Chronic renal insufficiency with CKD.

7. Atrial fibrillation.

 

PAST SURGICAL HISTORY:  History of total left hip replacement.

 

ALLERGIES:  ALLERGY TO SULFA, CLINDAMYCIN.

 

SOCIAL HISTORY:  The patient is .  Lives with his wife.

 

HABITS:  Denies smoking.  Denies alcohol use.  Denies illicit drug use.

 

MEDICATIONS:  Restarted.

 

PHYSICAL EXAMINATION:

GENERAL:  This is a 79-year-old male, who is alert and oriented x3, in no gross

distress. 

VITAL SIGNS:  Temperature 98.2, pulse 102, respirations 16, blood pressure 116/98. 

HEENT:  Head is atraumatic and normocephalic.  Pupils bilaterally equal and reactive to

light.  Extraocular muscles are intact. 

NECK:  Supple.  No JVD.  No carotid bruit. 

LUNGS:  Clear to auscultation and percussion bilaterally.  No added sounds. 

HEART:  S1 and S2.  Tachycardia.  No S3.  No S4.  No murmur. 

ABDOMEN:  Soft, nontender.  No guarding.  No rigidity. 

EXTREMITIES:  Chronic mild edema. 

CNS:  Grossly nonfocal.

RADIOGRAPHIC DATA:  Chest x-ray, possible pneumonia.  EKG, atrial fibrillation with 140

per minute. 

 

LABORATORY DATA:  White count 8.414, hemoglobin 8.8, hematocrit 29.8, and platelets 299.

 Sodium 132, potassium 3.4, BUN 33, creatinine 2.02, __________ 

 

ASSESSMENT:  

1. Atrial fibrillation with rapid ventricular response.

2. Congestive heart failure.

3. Chronic kidney disease.

4. Anemia.

5. Chronic deep venous thrombosis.

6. History of rheumatoid arthritis.

7. Chronic pain syndrome.

8. Pneumonia.

9. History of carcinoma of the prostate.

 

PLAN:  Admit patient to ICU.  IV amiodarone, IV Rocephin 2 g daily, IV Zithromax 500 mg

daily.  Hematologic consult, Dr. Gann __________ management.  Lab in the morning.

Condition and prognosis were explained to the patient. 

 

 

 

 

______________________________

MD JEREMY Phillips/THOR

D:  09/26/2019 20:30:28

T:  09/27/2019 04:36:10

Job #:  [id number]

## 2019-09-28 VITALS — SYSTOLIC BLOOD PRESSURE: 93 MMHG | DIASTOLIC BLOOD PRESSURE: 71 MMHG

## 2019-09-28 VITALS — DIASTOLIC BLOOD PRESSURE: 88 MMHG | SYSTOLIC BLOOD PRESSURE: 114 MMHG

## 2019-09-28 VITALS — DIASTOLIC BLOOD PRESSURE: 79 MMHG | SYSTOLIC BLOOD PRESSURE: 89 MMHG

## 2019-09-28 VITALS — DIASTOLIC BLOOD PRESSURE: 53 MMHG | SYSTOLIC BLOOD PRESSURE: 78 MMHG

## 2019-09-28 VITALS — DIASTOLIC BLOOD PRESSURE: 70 MMHG | SYSTOLIC BLOOD PRESSURE: 98 MMHG

## 2019-09-28 VITALS — SYSTOLIC BLOOD PRESSURE: 88 MMHG | DIASTOLIC BLOOD PRESSURE: 75 MMHG

## 2019-09-28 VITALS — SYSTOLIC BLOOD PRESSURE: 107 MMHG | DIASTOLIC BLOOD PRESSURE: 80 MMHG

## 2019-09-28 VITALS — DIASTOLIC BLOOD PRESSURE: 70 MMHG | SYSTOLIC BLOOD PRESSURE: 92 MMHG

## 2019-09-28 VITALS — SYSTOLIC BLOOD PRESSURE: 115 MMHG | DIASTOLIC BLOOD PRESSURE: 90 MMHG

## 2019-09-28 VITALS — SYSTOLIC BLOOD PRESSURE: 97 MMHG | DIASTOLIC BLOOD PRESSURE: 83 MMHG

## 2019-09-28 VITALS — SYSTOLIC BLOOD PRESSURE: 96 MMHG | DIASTOLIC BLOOD PRESSURE: 77 MMHG

## 2019-09-28 VITALS — SYSTOLIC BLOOD PRESSURE: 92 MMHG | DIASTOLIC BLOOD PRESSURE: 72 MMHG

## 2019-09-28 VITALS — SYSTOLIC BLOOD PRESSURE: 81 MMHG | DIASTOLIC BLOOD PRESSURE: 64 MMHG

## 2019-09-28 VITALS — SYSTOLIC BLOOD PRESSURE: 89 MMHG | DIASTOLIC BLOOD PRESSURE: 74 MMHG

## 2019-09-28 VITALS — SYSTOLIC BLOOD PRESSURE: 107 MMHG | DIASTOLIC BLOOD PRESSURE: 83 MMHG

## 2019-09-28 VITALS — DIASTOLIC BLOOD PRESSURE: 76 MMHG | SYSTOLIC BLOOD PRESSURE: 97 MMHG

## 2019-09-28 VITALS — SYSTOLIC BLOOD PRESSURE: 115 MMHG | DIASTOLIC BLOOD PRESSURE: 100 MMHG

## 2019-09-28 VITALS — DIASTOLIC BLOOD PRESSURE: 71 MMHG | SYSTOLIC BLOOD PRESSURE: 93 MMHG

## 2019-09-28 VITALS — SYSTOLIC BLOOD PRESSURE: 102 MMHG | DIASTOLIC BLOOD PRESSURE: 72 MMHG

## 2019-09-28 VITALS — DIASTOLIC BLOOD PRESSURE: 83 MMHG | SYSTOLIC BLOOD PRESSURE: 104 MMHG

## 2019-09-28 VITALS — SYSTOLIC BLOOD PRESSURE: 96 MMHG | DIASTOLIC BLOOD PRESSURE: 61 MMHG

## 2019-09-28 LAB
ANION GAP SERPL CALC-SCNC: 11.7 MMOL/L (ref 8–16)
BASOPHILS # BLD AUTO: 0 10*3/UL (ref 0–0.1)
BASOPHILS NFR BLD AUTO: 0.1 % (ref 0–1)
BUN SERPL-MCNC: 38 MG/DL (ref 7–26)
BUN/CREAT SERPL: 17 (ref 6–25)
CALCIUM SERPL-MCNC: 8.1 MG/DL (ref 8.4–10.2)
CHLORIDE SERPL-SCNC: 104 MMOL/L (ref 98–107)
CO2 SERPL-SCNC: 25 MMOL/L (ref 22–29)
DEPRECATED NEUTROPHILS # BLD AUTO: 8.2 10*3/UL (ref 2.1–6.9)
EGFRCR SERPLBLD CKD-EPI 2021: 29 ML/MIN (ref 60–?)
EOSINOPHIL # BLD AUTO: 0.2 10*3/UL (ref 0–0.4)
EOSINOPHIL NFR BLD AUTO: 1.7 % (ref 0–6)
ERYTHROCYTE [DISTWIDTH] IN CORD BLOOD: 15.3 % (ref 11.7–14.4)
GLUCOSE SERPLBLD-MCNC: 239 MG/DL (ref 74–118)
HCT VFR BLD AUTO: 28.4 % (ref 38.2–49.6)
HGB BLD-MCNC: 8.4 G/DL (ref 14–18)
LYMPHOCYTES # BLD: 0.8 10*3/UL (ref 1–3.2)
LYMPHOCYTES NFR BLD AUTO: 8.5 % (ref 18–39.1)
MCH RBC QN AUTO: 23.9 PG (ref 28–32)
MCHC RBC AUTO-ENTMCNC: 29.6 G/DL (ref 31–35)
MCV RBC AUTO: 80.9 FL (ref 81–99)
MONOCYTES # BLD AUTO: 0.2 10*3/UL (ref 0.2–0.8)
MONOCYTES NFR BLD AUTO: 2.5 % (ref 4.4–11.3)
NEUTS SEG NFR BLD AUTO: 86.8 % (ref 38.7–80)
PLATELET # BLD AUTO: 299 X10E3/UL (ref 140–360)
POTASSIUM SERPL-SCNC: 4.7 MMOL/L (ref 3.5–5.1)
RBC # BLD AUTO: 3.51 X10E6/UL (ref 4.3–5.7)
SODIUM SERPL-SCNC: 136 MMOL/L (ref 136–145)

## 2019-09-28 RX ADMIN — APIXABAN SCH MG: 5 TABLET, FILM COATED ORAL at 18:44

## 2019-09-28 RX ADMIN — CEFTRIAXONE SCH MLS/HR: 2 INJECTION, SOLUTION INTRAVENOUS at 22:08

## 2019-09-28 RX ADMIN — MORPHINE SULFATE PRN MG: 30 TABLET, EXTENDED RELEASE ORAL at 01:35

## 2019-09-28 RX ADMIN — AMIODARONE HYDROCHLORIDE SCH MG: 200 TABLET ORAL at 09:01

## 2019-09-28 RX ADMIN — SODIUM CHLORIDE SCH MLS/HR: 900 INJECTION, SOLUTION INTRAVENOUS at 20:05

## 2019-09-28 RX ADMIN — HYDROMORPHONE HYDROCHLORIDE PRN MG: 2 INJECTION INTRAMUSCULAR; INTRAVENOUS; SUBCUTANEOUS at 11:22

## 2019-09-28 RX ADMIN — FAMOTIDINE SCH MG: 10 INJECTION, SOLUTION INTRAVENOUS at 09:01

## 2019-09-28 RX ADMIN — HYDROMORPHONE HYDROCHLORIDE PRN MG: 2 INJECTION INTRAMUSCULAR; INTRAVENOUS; SUBCUTANEOUS at 20:00

## 2019-09-28 RX ADMIN — METOPROLOL TARTRATE SCH MG: 25 TABLET, FILM COATED ORAL at 09:00

## 2019-09-28 RX ADMIN — METOPROLOL TARTRATE SCH MG: 25 TABLET, FILM COATED ORAL at 15:00

## 2019-09-28 RX ADMIN — FAMOTIDINE SCH MG: 10 INJECTION, SOLUTION INTRAVENOUS at 22:08

## 2019-09-28 RX ADMIN — HYDROMORPHONE HYDROCHLORIDE PRN MG: 2 INJECTION INTRAMUSCULAR; INTRAVENOUS; SUBCUTANEOUS at 15:37

## 2019-09-28 RX ADMIN — AMIODARONE HYDROCHLORIDE SCH MG: 200 TABLET ORAL at 22:08

## 2019-09-28 RX ADMIN — METOPROLOL TARTRATE SCH MG: 25 TABLET, FILM COATED ORAL at 03:00

## 2019-09-28 RX ADMIN — HYDROMORPHONE HYDROCHLORIDE PRN MG: 2 INJECTION INTRAMUSCULAR; INTRAVENOUS; SUBCUTANEOUS at 05:44

## 2019-09-28 RX ADMIN — APIXABAN SCH MG: 5 TABLET, FILM COATED ORAL at 09:01

## 2019-09-28 NOTE — NUR
Report received. Assumed care. Assessment done. See interventions. Scalp incision with 
sutures intact. IV saline locked. No c/o verbalized.

## 2019-09-28 NOTE — HISTORY AND PHYSICAL
CHIEF COMPLAINT:  Palpitation and weakness.

 

HISTORY OF PRESENT ILLNESS:  The patient is 79-year-old, whom I saw in clinic, presented

with tachycardia.  EKG showed atrial fibrillation with RVR, heart rates in the 140s to

150s and he was feeling weak, dizzy, short of breath.  Echocardiogram revealed preserved

left ventricular ejection fraction and no severe valvular abnormalities.  No pericardial

effusion.  Given his symptoms and very high heart rate, decision was made to send the

patient to the hospital for IV amiodarone and possible cardioversion.  Overnight, his

heart rate became well controlled now in the 80s, though he remains in atrial

fibrillation.  He feels a lot better and was converted to oral amiodarone.  However,

labs showed multiple abnormalities, which require further workup and possible pneumonia

on chest x-ray, which requires treatment.  The patient will remain in the hospital to

address these issues. 

 

PAST MEDICAL HISTORY:  

1. Prostate cancer.

2. Hypertension.

3. DVT of both lower extremities, on anticoagulation.

4. Rheumatoid arthritis.

5. Congestive heart failure.

6. Chronic renal insufficiency.

7. Atrial fibrillation.

 

PAST SURGICAL HISTORY:  Total hip replacement.

 

ALLERGIES:  ALLERGIC TO SULFA AND CLINDAMYCIN.

 

SOCIAL HISTORY:  Does not smoke, drink, or abuse drugs.  He is , lives with his

wife. 

 

FAMILY HISTORY:  Noncontributory.

 

OUTPATIENT MEDICATIONS:  Reviewed.

 

OBJECTIVE:  VITAL SIGNS:  Temperature afebrile, pulse 83, respiratory rate 14, blood

pressure 100/78, saturating 100% on 2 L nasal cannula. 

GENERAL:  Elderly man, thin, well-developed, no acute distress. 

CARDIOVASCULAR:  Irregular rate and rhythm.  No murmurs, rubs, or gallops. 

LUNGS:  Decreased breath sounds bilaterally at the bases. 

ABDOMEN:  Soft, nontender, nondistended.  No masses. 

NEUROLOGIC AND PSYCHIATRIC:  Alert and oriented to person, place, and time.  Normal

affect. 

 

INPATIENT MEDICATIONS:  Reviewed.

 

LABORATORY DATA:  Reviewed.  Notable for a hemoglobin of 7.5, down from hemoglobin of

8.8 yesterday.  Sodium of 130, potassium of 3.7, creatinine of 2.  Troponins negative.

. 

 

IMAGING DATA:  Reviewed.  Initial chest x-ray shows patchy retrocardiac opacity

concerning for pneumonia, this has improved on a followup chest x-ray today. 

 

TELEMETRY DATA:  Reviewed.  It shows atrial fibrillation with rapid ventricular

response, now better rate controlled with heart rates in 80s and 90s. 

 

ASSESSMENT:  

1. Atrial fibrillation with rapid ventricular response, now rate controlled.

2. Anemia.

3. Chronic kidney disease.

4. Possible pneumonia.

5. Hyponatremia.

 

PLAN:   Change over to oral amiodarone.  Rate control is much better now.  Continue

metoprolol 25 mg b.i.d.  Continue apixaban for now as there are no signs of overt GI

bleeding.  We will defer workup to the medical team.  We will continue to follow. 

 

 

 

 

______________________________

MD LAVON RochaP/MODL

D:  09/27/2019 18:59:55

T:  09/28/2019 02:01:01

Job #:  [id number]

## 2019-09-28 NOTE — DIAGNOSTIC IMAGING REPORT
EXAMINATION:  CHEST SINGLE (PORTABLE)    



INDICATION: Short of breath  



COMPARISON:  Chest radiograph 9/27/2019

     

FINDINGS:  AP view   



TUBES and LINES:  Right chest wall port with right subclavian central venous

catheter, tip in the superior cavoatrial junction..



LUNGS:  Lungs are adequately inflated.  Lungs are clear. There is no evidence

of pneumonia or pulmonary edema.



PLEURA:  No pleural effusion or pneumothorax.



HEART AND MEDIASTINUM:  Cardiac size is mildly enlarged.    



BONES AND SOFT TISSUES:  No acute osseous lesion.  Soft tissues are

unremarkable. The partially visualized components of the bilateral shoulder

arthroplasty hardware appear intact. Leftward convex curvature of the lower

thoracolumbar spine.



UPPER ABDOMEN: No free air under the diaphragm.    



IMPRESSION: 



No acute thoracic radiographic abnormality.

Mild cardiomegaly.





Signed by: Nick Abdalla DO on 9/28/2019 7:43 AM

## 2019-09-28 NOTE — NUR
ok per Dr. MARCELA Koch for pt to go to floor if ok with cardiology. waiting for Dr. Samuels to 
round for orders. will continue to monitor

## 2019-09-29 VITALS — SYSTOLIC BLOOD PRESSURE: 106 MMHG | DIASTOLIC BLOOD PRESSURE: 80 MMHG

## 2019-09-29 VITALS — SYSTOLIC BLOOD PRESSURE: 122 MMHG | DIASTOLIC BLOOD PRESSURE: 100 MMHG

## 2019-09-29 VITALS — DIASTOLIC BLOOD PRESSURE: 82 MMHG | SYSTOLIC BLOOD PRESSURE: 119 MMHG

## 2019-09-29 VITALS — DIASTOLIC BLOOD PRESSURE: 80 MMHG | SYSTOLIC BLOOD PRESSURE: 106 MMHG

## 2019-09-29 VITALS — SYSTOLIC BLOOD PRESSURE: 135 MMHG | DIASTOLIC BLOOD PRESSURE: 85 MMHG

## 2019-09-29 VITALS — SYSTOLIC BLOOD PRESSURE: 103 MMHG | DIASTOLIC BLOOD PRESSURE: 75 MMHG

## 2019-09-29 VITALS — DIASTOLIC BLOOD PRESSURE: 81 MMHG | SYSTOLIC BLOOD PRESSURE: 97 MMHG

## 2019-09-29 VITALS — DIASTOLIC BLOOD PRESSURE: 82 MMHG | SYSTOLIC BLOOD PRESSURE: 104 MMHG

## 2019-09-29 VITALS — SYSTOLIC BLOOD PRESSURE: 114 MMHG | DIASTOLIC BLOOD PRESSURE: 87 MMHG

## 2019-09-29 VITALS — DIASTOLIC BLOOD PRESSURE: 79 MMHG | SYSTOLIC BLOOD PRESSURE: 95 MMHG

## 2019-09-29 VITALS — DIASTOLIC BLOOD PRESSURE: 75 MMHG | SYSTOLIC BLOOD PRESSURE: 96 MMHG

## 2019-09-29 VITALS — DIASTOLIC BLOOD PRESSURE: 93 MMHG | SYSTOLIC BLOOD PRESSURE: 115 MMHG

## 2019-09-29 VITALS — SYSTOLIC BLOOD PRESSURE: 114 MMHG | DIASTOLIC BLOOD PRESSURE: 96 MMHG

## 2019-09-29 VITALS — DIASTOLIC BLOOD PRESSURE: 83 MMHG | SYSTOLIC BLOOD PRESSURE: 107 MMHG

## 2019-09-29 VITALS — SYSTOLIC BLOOD PRESSURE: 104 MMHG | DIASTOLIC BLOOD PRESSURE: 83 MMHG

## 2019-09-29 VITALS — SYSTOLIC BLOOD PRESSURE: 111 MMHG | DIASTOLIC BLOOD PRESSURE: 82 MMHG

## 2019-09-29 VITALS — SYSTOLIC BLOOD PRESSURE: 115 MMHG | DIASTOLIC BLOOD PRESSURE: 87 MMHG

## 2019-09-29 VITALS — SYSTOLIC BLOOD PRESSURE: 110 MMHG | DIASTOLIC BLOOD PRESSURE: 85 MMHG

## 2019-09-29 VITALS — SYSTOLIC BLOOD PRESSURE: 105 MMHG | DIASTOLIC BLOOD PRESSURE: 74 MMHG

## 2019-09-29 VITALS — SYSTOLIC BLOOD PRESSURE: 110 MMHG | DIASTOLIC BLOOD PRESSURE: 86 MMHG

## 2019-09-29 VITALS — SYSTOLIC BLOOD PRESSURE: 93 MMHG | DIASTOLIC BLOOD PRESSURE: 72 MMHG

## 2019-09-29 VITALS — DIASTOLIC BLOOD PRESSURE: 82 MMHG | SYSTOLIC BLOOD PRESSURE: 107 MMHG

## 2019-09-29 VITALS — DIASTOLIC BLOOD PRESSURE: 82 MMHG | SYSTOLIC BLOOD PRESSURE: 98 MMHG

## 2019-09-29 VITALS — DIASTOLIC BLOOD PRESSURE: 87 MMHG | SYSTOLIC BLOOD PRESSURE: 114 MMHG

## 2019-09-29 LAB
ALBUMIN SERPL-MCNC: 2.7 G/DL (ref 3.5–5)
ALBUMIN/GLOB SERPL: 1.4 {RATIO} (ref 0.8–2)
ALP SERPL-CCNC: 85 IU/L (ref 40–150)
ALT SERPL-CCNC: 22 IU/L (ref 0–55)
ANION GAP SERPL CALC-SCNC: 13.2 MMOL/L (ref 8–16)
BASOPHILS # BLD AUTO: 0.1 10*3/UL (ref 0–0.1)
BASOPHILS NFR BLD AUTO: 0.5 % (ref 0–1)
BUN SERPL-MCNC: 33 MG/DL (ref 7–26)
BUN/CREAT SERPL: 18 (ref 6–25)
CALCIUM SERPL-MCNC: 8.3 MG/DL (ref 8.4–10.2)
CHLORIDE SERPL-SCNC: 103 MMOL/L (ref 98–107)
CO2 SERPL-SCNC: 25 MMOL/L (ref 22–29)
DEPRECATED NEUTROPHILS # BLD AUTO: 7.8 10*3/UL (ref 2.1–6.9)
EGFRCR SERPLBLD CKD-EPI 2021: 35 ML/MIN (ref 60–?)
EOSINOPHIL # BLD AUTO: 0.1 10*3/UL (ref 0–0.4)
EOSINOPHIL NFR BLD AUTO: 1 % (ref 0–6)
ERYTHROCYTE [DISTWIDTH] IN CORD BLOOD: 15.3 % (ref 11.7–14.4)
GLOBULIN PLAS-MCNC: 2 G/DL (ref 2.3–3.5)
GLUCOSE SERPLBLD-MCNC: 97 MG/DL (ref 74–118)
HCT VFR BLD AUTO: 28.5 % (ref 38.2–49.6)
HGB BLD-MCNC: 8.5 G/DL (ref 14–18)
LYMPHOCYTES # BLD: 1.8 10*3/UL (ref 1–3.2)
LYMPHOCYTES NFR BLD AUTO: 16.9 % (ref 18–39.1)
MCH RBC QN AUTO: 23.9 PG (ref 28–32)
MCHC RBC AUTO-ENTMCNC: 29.8 G/DL (ref 31–35)
MCV RBC AUTO: 80.1 FL (ref 81–99)
MONOCYTES # BLD AUTO: 0.9 10*3/UL (ref 0.2–0.8)
MONOCYTES NFR BLD AUTO: 8.6 % (ref 4.4–11.3)
NEUTS SEG NFR BLD AUTO: 72.6 % (ref 38.7–80)
PLATELET # BLD AUTO: 333 X10E3/UL (ref 140–360)
POTASSIUM SERPL-SCNC: 4.2 MMOL/L (ref 3.5–5.1)
RBC # BLD AUTO: 3.56 X10E6/UL (ref 4.3–5.7)
SODIUM SERPL-SCNC: 137 MMOL/L (ref 136–145)

## 2019-09-29 RX ADMIN — AMIODARONE HYDROCHLORIDE SCH MG: 200 TABLET ORAL at 08:24

## 2019-09-29 RX ADMIN — HYDROMORPHONE HYDROCHLORIDE PRN MG: 2 INJECTION INTRAMUSCULAR; INTRAVENOUS; SUBCUTANEOUS at 10:00

## 2019-09-29 RX ADMIN — AMIODARONE HYDROCHLORIDE SCH MG: 200 TABLET ORAL at 21:25

## 2019-09-29 RX ADMIN — SODIUM CHLORIDE SCH MLS/HR: 900 INJECTION, SOLUTION INTRAVENOUS at 20:11

## 2019-09-29 RX ADMIN — HYDROMORPHONE HYDROCHLORIDE PRN MG: 2 INJECTION INTRAMUSCULAR; INTRAVENOUS; SUBCUTANEOUS at 23:05

## 2019-09-29 RX ADMIN — HYDROMORPHONE HYDROCHLORIDE PRN MG: 2 INJECTION INTRAMUSCULAR; INTRAVENOUS; SUBCUTANEOUS at 15:04

## 2019-09-29 RX ADMIN — APIXABAN SCH MG: 5 TABLET, FILM COATED ORAL at 18:24

## 2019-09-29 RX ADMIN — FAMOTIDINE SCH MG: 10 INJECTION, SOLUTION INTRAVENOUS at 08:24

## 2019-09-29 RX ADMIN — HYDROMORPHONE HYDROCHLORIDE PRN MG: 2 INJECTION INTRAMUSCULAR; INTRAVENOUS; SUBCUTANEOUS at 04:40

## 2019-09-29 RX ADMIN — HYDROMORPHONE HYDROCHLORIDE PRN MG: 2 INJECTION INTRAMUSCULAR; INTRAVENOUS; SUBCUTANEOUS at 19:00

## 2019-09-29 RX ADMIN — HYDROMORPHONE HYDROCHLORIDE PRN MG: 2 INJECTION INTRAMUSCULAR; INTRAVENOUS; SUBCUTANEOUS at 00:07

## 2019-09-29 RX ADMIN — FAMOTIDINE SCH MG: 10 INJECTION, SOLUTION INTRAVENOUS at 21:25

## 2019-09-29 RX ADMIN — CEFTRIAXONE SCH MLS/HR: 2 INJECTION, SOLUTION INTRAVENOUS at 21:25

## 2019-09-29 RX ADMIN — APIXABAN SCH MG: 5 TABLET, FILM COATED ORAL at 08:24

## 2019-09-29 NOTE — PROGRESS NOTE
DATE:  09/28/2019

 

Cardiology Progress Note 

 

SUBJECTIVE:  The patient denies chest pain or shortness of breath.

 

OBJECTIVE:  VITAL SIGNS:  Temperature 98.6 degrees, pulse 87, respiratory rate 
15, blood

pressure 97/83, and oxygen saturation 100% on room air. 

GENERAL:  Elderly man, in no acute distress, awake and alert. 

LUNGS:  Decreased breath sounds at the bases. 

CARDIOVASCULAR:  Irregularly irregular, tachycardic.  No murmur.  Normal S1 and 
S2. 

ABDOMEN:  Soft, nontender. 

EXTREMITIES:  No edema.

 

CARDIAC MEDICATIONS:  Amiodarone 200 mg p.o. q.12 hours, apixaban 5 mg p.o. 
b.i.d.

 

LABORATORY DATA:  WBC 9.43, hemoglobin 8.4, hematocrit 28.4, and platelets 299. 
Sodium

136, potassium 4.7, chloride 104, CO2 of 25, BUN 38, creatinine 2.22. 

 

TELEMETRY:  Atrial fibrillation with rapid ventricular response.

 

IMPRESSION:  

1. Atrial fibrillation with rapid ventricular response.

2. Chronic kidney disease.

3. Anemia.

4. Hypotension.

5. Hyponatremia, resolved.

 

RECOMMENDATIONS:  Continue amiodarone.  Continue Eliquis for CVA prophylaxis.  
The

patient has not been able to tolerate metoprolol due to hypotension.  If heart 
rate

remains borderline, attempt IV digoxin.  Echocardiogram was recently performed 
at the

office with preserved LVEF.  There were no severe valvular abnormalities and no

pericardial effusion.  Continue monitoring the patient closely on telemetry.  If
heart

rate does not improve, we will consider ROCCO cardioversion on Monday. 

 

Thank you for this consult.  We will continue to follow.

 

 

 

 

______________________________

Tasia Samuels MD

 

ABS/MODL

D:  09/29/2019 00:06:36

T:  09/29/2019 00:33:03

Job #:  [id number]

 

[place]

## 2019-09-29 NOTE — NUR
Report received. Assumed care. Assessment done. See interventions. O2 per NC @ 2L. IV saline 
locked.

## 2019-09-30 VITALS — DIASTOLIC BLOOD PRESSURE: 79 MMHG | SYSTOLIC BLOOD PRESSURE: 111 MMHG

## 2019-09-30 VITALS — SYSTOLIC BLOOD PRESSURE: 108 MMHG | DIASTOLIC BLOOD PRESSURE: 81 MMHG

## 2019-09-30 VITALS — SYSTOLIC BLOOD PRESSURE: 143 MMHG | DIASTOLIC BLOOD PRESSURE: 94 MMHG

## 2019-09-30 VITALS — DIASTOLIC BLOOD PRESSURE: 98 MMHG | SYSTOLIC BLOOD PRESSURE: 116 MMHG

## 2019-09-30 VITALS — SYSTOLIC BLOOD PRESSURE: 136 MMHG | DIASTOLIC BLOOD PRESSURE: 84 MMHG

## 2019-09-30 VITALS — SYSTOLIC BLOOD PRESSURE: 113 MMHG | DIASTOLIC BLOOD PRESSURE: 80 MMHG

## 2019-09-30 VITALS — DIASTOLIC BLOOD PRESSURE: 82 MMHG | SYSTOLIC BLOOD PRESSURE: 109 MMHG

## 2019-09-30 VITALS — DIASTOLIC BLOOD PRESSURE: 94 MMHG | SYSTOLIC BLOOD PRESSURE: 117 MMHG

## 2019-09-30 VITALS — DIASTOLIC BLOOD PRESSURE: 83 MMHG | SYSTOLIC BLOOD PRESSURE: 138 MMHG

## 2019-09-30 VITALS — SYSTOLIC BLOOD PRESSURE: 125 MMHG | DIASTOLIC BLOOD PRESSURE: 78 MMHG

## 2019-09-30 VITALS — DIASTOLIC BLOOD PRESSURE: 97 MMHG | SYSTOLIC BLOOD PRESSURE: 109 MMHG

## 2019-09-30 VITALS — DIASTOLIC BLOOD PRESSURE: 80 MMHG | SYSTOLIC BLOOD PRESSURE: 121 MMHG

## 2019-09-30 VITALS — DIASTOLIC BLOOD PRESSURE: 81 MMHG | SYSTOLIC BLOOD PRESSURE: 108 MMHG

## 2019-09-30 VITALS — DIASTOLIC BLOOD PRESSURE: 82 MMHG | SYSTOLIC BLOOD PRESSURE: 110 MMHG

## 2019-09-30 VITALS — DIASTOLIC BLOOD PRESSURE: 91 MMHG | SYSTOLIC BLOOD PRESSURE: 130 MMHG

## 2019-09-30 VITALS — DIASTOLIC BLOOD PRESSURE: 83 MMHG | SYSTOLIC BLOOD PRESSURE: 148 MMHG

## 2019-09-30 VITALS — SYSTOLIC BLOOD PRESSURE: 125 MMHG | DIASTOLIC BLOOD PRESSURE: 67 MMHG

## 2019-09-30 VITALS — DIASTOLIC BLOOD PRESSURE: 93 MMHG | SYSTOLIC BLOOD PRESSURE: 130 MMHG

## 2019-09-30 VITALS — DIASTOLIC BLOOD PRESSURE: 93 MMHG | SYSTOLIC BLOOD PRESSURE: 119 MMHG

## 2019-09-30 VITALS — DIASTOLIC BLOOD PRESSURE: 72 MMHG | SYSTOLIC BLOOD PRESSURE: 107 MMHG

## 2019-09-30 VITALS — DIASTOLIC BLOOD PRESSURE: 73 MMHG | SYSTOLIC BLOOD PRESSURE: 110 MMHG

## 2019-09-30 RX ADMIN — APIXABAN SCH MG: 5 TABLET, FILM COATED ORAL at 08:00

## 2019-09-30 RX ADMIN — HYDROMORPHONE HYDROCHLORIDE PRN MG: 2 INJECTION INTRAMUSCULAR; INTRAVENOUS; SUBCUTANEOUS at 18:55

## 2019-09-30 RX ADMIN — MORPHINE SULFATE PRN MG: 30 TABLET, EXTENDED RELEASE ORAL at 19:44

## 2019-09-30 RX ADMIN — HYDROMORPHONE HYDROCHLORIDE PRN MG: 2 INJECTION INTRAMUSCULAR; INTRAVENOUS; SUBCUTANEOUS at 15:10

## 2019-09-30 RX ADMIN — HYDROMORPHONE HYDROCHLORIDE PRN MG: 2 INJECTION INTRAMUSCULAR; INTRAVENOUS; SUBCUTANEOUS at 11:06

## 2019-09-30 RX ADMIN — HYDROMORPHONE HYDROCHLORIDE PRN MG: 2 INJECTION INTRAMUSCULAR; INTRAVENOUS; SUBCUTANEOUS at 07:00

## 2019-09-30 RX ADMIN — HYDROMORPHONE HYDROCHLORIDE PRN MG: 2 INJECTION INTRAMUSCULAR; INTRAVENOUS; SUBCUTANEOUS at 22:36

## 2019-09-30 RX ADMIN — SODIUM CHLORIDE SCH MLS/HR: 900 INJECTION, SOLUTION INTRAVENOUS at 20:06

## 2019-09-30 RX ADMIN — FAMOTIDINE SCH MG: 10 INJECTION, SOLUTION INTRAVENOUS at 08:00

## 2019-09-30 RX ADMIN — AMIODARONE HYDROCHLORIDE SCH MG: 200 TABLET ORAL at 20:08

## 2019-09-30 RX ADMIN — AMIODARONE HYDROCHLORIDE SCH MG: 200 TABLET ORAL at 08:00

## 2019-09-30 RX ADMIN — APIXABAN SCH MG: 5 TABLET, FILM COATED ORAL at 16:13

## 2019-09-30 RX ADMIN — FAMOTIDINE SCH MG: 10 INJECTION, SOLUTION INTRAVENOUS at 20:06

## 2019-09-30 RX ADMIN — CEFTRIAXONE SCH MLS/HR: 2 INJECTION, SOLUTION INTRAVENOUS at 21:39

## 2019-09-30 NOTE — PROGRESS NOTE
DATE:  09/30/2019

 

Cardiology Progress Note 

 

SUBJECTIVE:  The patient is feeling better.  Reports fatigue.  Denies any chest pain or

shortness of breath. 

 

OBJECTIVE:  VITAL SIGNS:  Temperature is 98, heart rate ranging from 101 to 123,

respirations are 20, blood pressure is 108/81, and oxygen saturation 98% on room air. 

GENERAL:  He is an elderly man, lying comfortably in bed, in no apparent distress. 

CARDIOVASCULAR:  Irregularly irregular, tachycardic.  No murmurs. 

LUNGS:  Clear to auscultation. 

ABDOMEN:  Soft, nontender, and nondistended. 

EXTREMITIES:  No edema.

 

CARDIOVASCULAR MEDICATIONS:  Reviewed.

 

LABORATORY DATA:  Reviewed.  Hemoglobin 8.5, creatinine 1.88.

 

TELEMETRY:  Monitoring revealed atrial fibrillation with right ventricular response.

 

IMPRESSION:  

1. Atrial fibrillation with rapid ventricular response.

2. Chronic kidney disease, improved.

3. Anemia.

4. Hypertension.

 

RECOMMENDATIONS:  Continue current cardiovascular medications consisting of amiodarone

and Eliquis.  The patient was not able to tolerate beta-blockers due to episodes of

hypotension.  He has received intermittent doses of digoxin.  Continue to monitor

electrolytes and normalize levels.  We will plan for ROCCO cardioversion today. 

 

 

 

 

______________________________

DO BECKY Aviles/THOR

D:  09/30/2019 13:01:37

T:  09/30/2019 13:21:29

Job #:  [id number]

## 2019-09-30 NOTE — NUR
report off to Jose Cruz DE LEON RN of above facts. pt currently awaiting meal tray, right radial 
remains with + neurovascular function w/o gross deficits. Friend at bedside. -cgf

-------------------------------------------------------------------------------

Addendum: 09/30/19 at 1237 by Alex Rueda RN

-------------------------------------------------------------------------------

strike above note - incorrect patient - cgf

## 2019-09-30 NOTE — NUR
Patient NPO this morning for procedure. Patient ambulated in hallway with physical therapy 
on room air. During ambulation patient desaturated to 80%. Orders placed for home O2 
evaluation. Dr. Jamison ordered CT of chest WO contrast. Chest CT completed.  Patient went 
for ROCCO with cardioversion this afternoon. EKG ordered and completed, pt is running NSR with 
PVC's. Will continue to monitor the patient. 

-------------------------------------------------------------------------------

Addendum: 09/30/19 at 1839 by Beverly Rodriguez RN

-------------------------------------------------------------------------------

1800- Dr. Estrella rounded on pt and informed of patients poor venous access. MD accessed R 
portacath and ordered heparin flush.

## 2019-09-30 NOTE — DIAGNOSTIC IMAGING REPORT
EXAM: CT Chest WITHOUT intravenous contrast 9/30/2019 8:57 AM

INDICATION:  

COMPARISON: Atrial fibrillation, hypoxemia

TECHNIQUE:

Chest was scanned utilizing a multidetector helical scanner from the lung apex

through the level of the adrenal glands without administration of IV contrast.

Coronal and sagittal reformations were obtained. Routine protocol was

performed.



IV CONTRAST: None

RADIATION DOSE: Total DLP: 442.9 mGy*cm. Dose modulation, iterative

reconstruction, and/or weight based adjustment of the mA/kV was utilized to

reduce the radiation dose to as low as reasonably achievable. 

COMPLICATIONS: None



FINDINGS:



LINES/ TUBES: Right subclavian chest port terminates in the superior vena cava.



LUNGS AND AIRWAYS:  Increased AP diameter of the chest. The central airways are

patent. No focal consolidation. Mild lower lobe predominant smooth interlobular

septal thickening compatible with mild interstitial edema. Mild lingular and

bilateral lower lobe bronchiectasis. Bibasilar dependent subsegmental

atelectasis. 



PLEURA: Small left pleural effusion. No pneumothorax.



HEART AND MEDIASTINUM: The thyroid gland is not well visualized due to streak

artifact related to bilateral total shoulder replacements. No definite

supraclavicular, axillary, mediastinal, or hilar lymphadenopathy.  Multichamber

cardiomegaly. Diffuse atherosclerotic calcifications involve the coronary

arteries and aorta.  No pericardial effusion. 



UPPER ABDOMEN: Limited noncontrast views of the upper abdomen demonstrate no

focal abnormality of the partially visualized liver, spleen, left kidney,

adrenals, or pancreas. The native right kidney appears atrophic.



BONES: Status post right and left shoulder arthroplasty. Substantial

degenerative changes and levoconvex curvature of the thoracolumbar spine.

Diffuse osteopenia. No acute osseous injury.



SOFT TISSUES: Diffuse muscle atrophy and mild diffuse subcutaneous soft tissue

edema.



IMPRESSION: 

Multichamber cardiomegaly and mild interstitial pulmonary edema. Small left

pleural effusion.



Mild lingular and bilateral lower lobe bronchiectasis. Bibasilar dependent

subsegmental atelectasis.



Diffuse atherosclerotic calcifications including of the coronary arteries.



Atrophic native right kidney.





Signed by: Destiney Rivera MD on 9/30/2019 2:09 PM

## 2019-09-30 NOTE — PROGRESS NOTE
DATE:  09/29/2019

 

Cardiology Progress Note 

 

SUBJECTIVE:  The patient reports his shortness of breath remains, but has improved.  He

did report an episode of chest pain last night 4/10 in severity, lasting seconds at a

time, which he described as a pulling sensation. 

 

OBJECTIVE:  VITAL SIGNS: Temperature 97.9 degrees, pulse 120, respiratory rate 21, blood

pressure 135/85, and oxygen saturation 95% on room air. 

GENERAL:  Elderly man, in no acute distress, awake, and alert. 

LUNGS:  Decreased breath sounds at the bases. 

CARDIOVASCULAR:  Irregularly irregular, tachycardic.  No murmur.  Normal S1 and S2. 

ABDOMEN:  Soft, nontender. 

EXTREMITIES:  1+ edema.

 

CARDIAC MEDICATIONS:  Apixaban 5 mg p.o. b.i.d. and amiodarone 200 mg p.o. q.12 hours.

 

LABORATORY DATA:  WBC 10.69, hemoglobin 8.5, hematocrit 28.5, and platelets 333.  Sodium

137, potassium 4.2, chloride 103, CO2 of 25, BUN 33, and creatinine 1.88. 

 

TELEMETRY:  Atrial fibrillation with rapid ventricular response.

 

IMPRESSION:  

1. Atrial fibrillation with rapid ventricular response.

2. Chronic kidney disease.

3. Anemia.

4. Hypertension.

5. Hyponatremia, resolved.

 

RECOMMENDATIONS:  

1. Continue amiodarone.  Continue Eliquis for CVA prophylaxis.  The patient has not been

able to tolerate beta blockade to achieve rate control due to episodes of hypotension.

We will give dose of digoxin.  Due to inability to achieve rate control, plan for ROCCO

cardioversion tomorrow.  N.p.o. after midnight. 

2. Monitor the patient closely on telemetry.  Echocardiogram was performed at the office

with preserved LVEF without severe valvular abnormalities and no pericardial effusion. 

Thank you for this consult.  We will continue to follow.

 

 

 

 

______________________________

Tasia Samuels MD

 

ABS/MODL

D:  09/29/2019 20:52:39

T:  09/30/2019 00:59:09

Job #:  [id number]

## 2019-10-01 VITALS — SYSTOLIC BLOOD PRESSURE: 126 MMHG | DIASTOLIC BLOOD PRESSURE: 70 MMHG

## 2019-10-01 VITALS — DIASTOLIC BLOOD PRESSURE: 70 MMHG | SYSTOLIC BLOOD PRESSURE: 126 MMHG

## 2019-10-01 VITALS — SYSTOLIC BLOOD PRESSURE: 105 MMHG | DIASTOLIC BLOOD PRESSURE: 66 MMHG

## 2019-10-01 VITALS — SYSTOLIC BLOOD PRESSURE: 134 MMHG | DIASTOLIC BLOOD PRESSURE: 79 MMHG

## 2019-10-01 VITALS — SYSTOLIC BLOOD PRESSURE: 116 MMHG | DIASTOLIC BLOOD PRESSURE: 73 MMHG

## 2019-10-01 VITALS — SYSTOLIC BLOOD PRESSURE: 125 MMHG | DIASTOLIC BLOOD PRESSURE: 78 MMHG

## 2019-10-01 VITALS — SYSTOLIC BLOOD PRESSURE: 123 MMHG | DIASTOLIC BLOOD PRESSURE: 78 MMHG

## 2019-10-01 VITALS — DIASTOLIC BLOOD PRESSURE: 78 MMHG | SYSTOLIC BLOOD PRESSURE: 119 MMHG

## 2019-10-01 VITALS — SYSTOLIC BLOOD PRESSURE: 102 MMHG | DIASTOLIC BLOOD PRESSURE: 77 MMHG

## 2019-10-01 VITALS — DIASTOLIC BLOOD PRESSURE: 73 MMHG | SYSTOLIC BLOOD PRESSURE: 146 MMHG

## 2019-10-01 VITALS — SYSTOLIC BLOOD PRESSURE: 142 MMHG | DIASTOLIC BLOOD PRESSURE: 85 MMHG

## 2019-10-01 VITALS — SYSTOLIC BLOOD PRESSURE: 127 MMHG | DIASTOLIC BLOOD PRESSURE: 56 MMHG

## 2019-10-01 RX ADMIN — FAMOTIDINE SCH MG: 10 INJECTION, SOLUTION INTRAVENOUS at 20:27

## 2019-10-01 RX ADMIN — HYDROMORPHONE HYDROCHLORIDE PRN MG: 2 INJECTION INTRAMUSCULAR; INTRAVENOUS; SUBCUTANEOUS at 02:57

## 2019-10-01 RX ADMIN — APIXABAN SCH MG: 5 TABLET, FILM COATED ORAL at 17:07

## 2019-10-01 RX ADMIN — HYDROMORPHONE HYDROCHLORIDE PRN MG: 2 INJECTION INTRAMUSCULAR; INTRAVENOUS; SUBCUTANEOUS at 20:00

## 2019-10-01 RX ADMIN — HYDROMORPHONE HYDROCHLORIDE PRN MG: 2 INJECTION INTRAMUSCULAR; INTRAVENOUS; SUBCUTANEOUS at 14:10

## 2019-10-01 RX ADMIN — SODIUM CHLORIDE SCH MLS/HR: 900 INJECTION, SOLUTION INTRAVENOUS at 20:27

## 2019-10-01 RX ADMIN — APIXABAN SCH MG: 5 TABLET, FILM COATED ORAL at 08:09

## 2019-10-01 RX ADMIN — FAMOTIDINE SCH MG: 10 INJECTION, SOLUTION INTRAVENOUS at 08:09

## 2019-10-01 RX ADMIN — CEFTRIAXONE SCH MLS/HR: 2 INJECTION, SOLUTION INTRAVENOUS at 21:30

## 2019-10-01 RX ADMIN — AMIODARONE HYDROCHLORIDE SCH MG: 200 TABLET ORAL at 08:09

## 2019-10-01 RX ADMIN — HYDROMORPHONE HYDROCHLORIDE PRN MG: 2 INJECTION INTRAMUSCULAR; INTRAVENOUS; SUBCUTANEOUS at 06:46

## 2019-10-01 RX ADMIN — AMIODARONE HYDROCHLORIDE SCH MG: 200 TABLET ORAL at 20:27

## 2019-10-01 RX ADMIN — HYDROMORPHONE HYDROCHLORIDE PRN MG: 2 INJECTION INTRAMUSCULAR; INTRAVENOUS; SUBCUTANEOUS at 11:05

## 2019-10-01 NOTE — NUR
Patient has orders to transfer to medical surgical floor with telemetry. Report called and 
pt transferred to room 102, no s/s of distress noted.

## 2019-10-01 NOTE — PROGRESS NOTE
DATE:  10/01/2019

 

Cardiology Progress Note 

 

SUBJECTIVE:  The patient underwent ROCCO-guided cardioversion.  Feeling better.  Denies

any palpitations. 

 

OBJECTIVE:  VITAL SIGNS:  Temperature is 98.5, heart rate is 63, respirations are 13,

blood pressure is 142/85, and ox saturation 99% on 2 liters nasal cannula. 

GENERAL:  He is an elderly male, lying comfortably in bed. 

CARDIOVASCULAR:  Regular rate and rhythm with ectopy. 

LUNGS:  Diminished breath sounds at bases. 

ABDOMEN:  Soft, nontender, and nondistended. 

EXTREMITIES:  Trace edema.

 

CARDIOVASCULAR MEDICATIONS:  Reviewed.

 

LABORATORY DATA:  Creatinine 1.88.

 

TELEMETRY:  Monitoring revealed normal sinus rhythm with premature ventricular complexes

and ventricular couplets. 

 

IMPRESSION:  

1. Atrial fibrillation, status post direct current cardioversion.

2. Chronic kidney disease.

3. Anemia.

4. Hypertension.

 

RECOMMENDATIONS:  The patient underwent restoration of normal sinus rhythm with

ROCCO-guided cardioversion.  He was not able to tolerate beta blockers in the past due to

hypotension.  Continue amiodarone and Eliquis. 

 

 

 

 

______________________________

Luis M Ferrari DO

 

BM/MODL

D:  10/01/2019 16:39:38

T:  10/01/2019 19:19:45

Job #:  [id number]

## 2019-10-01 NOTE — NUR
Nutrition Intervention Note



RD Recommendation(s) for Physician: 

     - Continue ONS, recommend Ensure Compact TID.

     - Consider liberalization of diet due to poor intake

     -Consider appetite stimulant per MD if medically feasible. 

     - The patient meets criteria for unspecified SEVERE protein-calorie malnutrition 



Plan of Care: RD following, monitoring for tolerance and adequacy, ONS rec 



Nutrition reason for involvement: follow up



RD Assessment

10/1: Follow up: Pt was seen resting in bed in the ICU. He reported that his appetite has 
improved but he is still forcing himself to eat. Pt reported that he has been receiving 
the Ensure Enlive and he likes it. It is noted that there is no order for the supplement but 
the pt reports he is receiving it. Spoke about pt during rounds, pt is being downgraded to 
the floors. Pt reported he did have some N/V this morning but denied C/D/chewing or 
swallowing issues at this time.  Pt has been consuming 50% of his meals per meal assessment. 
Pt is on nasal cannula.  Will continue to monitor. 



9/27  80yo M, who was admitted for Afib with RVR and CHF. Pt stated he has been eating <50% 
of meals for > 1 month prior to admission. Pt mentioned he is trying to eat >50% of his 
meals. Pt reported he had lost wt and used to weigh 135 lbs 6 months ago. Per chart, pt had 
consumed 25% of dinner on 9/26. Pt has weights ranging from 110 to 114 lbs in chart for this 
admission. If accurate, this would be a 16-19% wt loss in 6 months  severe wt loss. No 
N/V/D/C reported and no chewing/swallowing issues. Performed NFPE, pt showed moderate  
severe signs of generalized muscle and fat depletion. Pt was interested in Ensure clear- 
will provide with meals. Will continue to monitor. 



Principal Problems/Diagnoses: afib with RVR



PMH: afib, CHF, carcinoma of prostate, HTN, DVT in lower extremities, severe RA, chronic 
pain syndrome 



I/O: ---



GI: flat, soft abdomen



Skin: intact



Labs: 

(10/1): BUN 33, Creat 1.88, Ca 8.3, tbili 0.1 

(9/27) Na 130, BUN, Creat 2.06, Ca 8.1 



Meds: abx,  eliquis, pepcid, cordarone, metroprolol, famotidine



Ht: 62 inches          

Wt: 122 lbs 

BMI: 22.3kg/m2          

IBW: 118 lbs +/- 10%



Malnutrition Evaluation (9/27)

The patient meets criteria for unspecified SEVERE protein-calorie malnutrition 



Energy intake:

<50% of estimated energy requirements for >1 month



Weight loss:

>10% in 6 months (Chronic)



Fat loss: Moderate: apparent ribs 

Muscle loss: Moderate - severe: temporal depression, squaring of shoulder, depression line 
on the thigh 



Supporting Evidence:

Fluid accumulation: chronic mild edema in extremities per chart

Functional Status: unable to evaluate



Nutrition Prescription (Diet Order): cardiac diet



Estimated Nutritional Needs:

Calories: 5682-2521 kcal (25-35 kcal/kg) Weight used : 55 kg CBW

Protein: 55-82g/day (1-1.5 g/kg) Weight used: 55 kg CBW



Diet Adequacy:

Not meeting calorie needs, Not meeting protein needs



Tolerance:

Tolerating PO 



Diet Education Needs Assessment:

Diet education not indicated



Nutrition Care Level:high 



Nutrition Diagnosis: 

Severe protein-calorie malnutrition related to chronic illness as evidenced by pt meeting 
<75% of energy needs for > 1 month, >10% wt loss in 6 months, and moderate-severe muscle and 
fat depletion



Goal: Patient will meet % of estimated needs by follow up 



Progress: progressing 



Interventions:

Commercial beverage, General healthful diet, medication prescription 



Monitoring/Evaluation:

-Total energy intake, Total protein intake, Liquid supplement, Weight change, medication 
prescription 



Signed: Thelma Wahl RD, LD

## 2019-10-02 VITALS — DIASTOLIC BLOOD PRESSURE: 78 MMHG | SYSTOLIC BLOOD PRESSURE: 161 MMHG

## 2019-10-02 VITALS — SYSTOLIC BLOOD PRESSURE: 127 MMHG | DIASTOLIC BLOOD PRESSURE: 69 MMHG

## 2019-10-02 VITALS — SYSTOLIC BLOOD PRESSURE: 126 MMHG | DIASTOLIC BLOOD PRESSURE: 59 MMHG

## 2019-10-02 VITALS — DIASTOLIC BLOOD PRESSURE: 64 MMHG | SYSTOLIC BLOOD PRESSURE: 129 MMHG

## 2019-10-02 VITALS — SYSTOLIC BLOOD PRESSURE: 129 MMHG | DIASTOLIC BLOOD PRESSURE: 64 MMHG

## 2019-10-02 VITALS — DIASTOLIC BLOOD PRESSURE: 59 MMHG | SYSTOLIC BLOOD PRESSURE: 126 MMHG

## 2019-10-02 VITALS — SYSTOLIC BLOOD PRESSURE: 153 MMHG | DIASTOLIC BLOOD PRESSURE: 77 MMHG

## 2019-10-02 VITALS — DIASTOLIC BLOOD PRESSURE: 65 MMHG | SYSTOLIC BLOOD PRESSURE: 126 MMHG

## 2019-10-02 RX ADMIN — APIXABAN SCH MG: 5 TABLET, FILM COATED ORAL at 08:29

## 2019-10-02 RX ADMIN — HYDROMORPHONE HYDROCHLORIDE PRN MG: 2 INJECTION INTRAMUSCULAR; INTRAVENOUS; SUBCUTANEOUS at 04:05

## 2019-10-02 RX ADMIN — HYDROMORPHONE HYDROCHLORIDE PRN MG: 2 INJECTION INTRAMUSCULAR; INTRAVENOUS; SUBCUTANEOUS at 00:00

## 2019-10-02 RX ADMIN — FAMOTIDINE SCH MG: 10 INJECTION, SOLUTION INTRAVENOUS at 08:29

## 2019-10-02 RX ADMIN — HYDROMORPHONE HYDROCHLORIDE PRN MG: 2 INJECTION INTRAMUSCULAR; INTRAVENOUS; SUBCUTANEOUS at 12:40

## 2019-10-02 RX ADMIN — AMIODARONE HYDROCHLORIDE SCH MG: 200 TABLET ORAL at 21:25

## 2019-10-02 RX ADMIN — AMIODARONE HYDROCHLORIDE SCH MG: 200 TABLET ORAL at 08:29

## 2019-10-02 RX ADMIN — HYDROMORPHONE HYDROCHLORIDE PRN MG: 2 INJECTION INTRAMUSCULAR; INTRAVENOUS; SUBCUTANEOUS at 08:29

## 2019-10-02 RX ADMIN — APIXABAN SCH MG: 5 TABLET, FILM COATED ORAL at 17:00

## 2019-10-02 RX ADMIN — HYDROMORPHONE HYDROCHLORIDE PRN MG: 2 INJECTION INTRAMUSCULAR; INTRAVENOUS; SUBCUTANEOUS at 17:00

## 2019-10-02 RX ADMIN — HYDROMORPHONE HYDROCHLORIDE PRN MG: 2 INJECTION INTRAMUSCULAR; INTRAVENOUS; SUBCUTANEOUS at 21:25

## 2019-10-02 RX ADMIN — FAMOTIDINE SCH MG: 10 INJECTION, SOLUTION INTRAVENOUS at 21:25

## 2019-10-02 RX ADMIN — SODIUM CHLORIDE SCH MLS/HR: 900 INJECTION, SOLUTION INTRAVENOUS at 21:25

## 2019-10-02 RX ADMIN — CEFTRIAXONE SCH MLS/HR: 2 INJECTION, SOLUTION INTRAVENOUS at 21:55

## 2019-10-02 NOTE — PROGRESS NOTE
DATE:  10/02/2019

 

Cardiology Progress Note 

 

SUBJECTIVE:  The patient found sleeping comfortably.  Denies any chest pain, shortness

of breath, or palpitations. 

 

OBJECTIVE:  VITAL SIGNS:  Temperature is 98.1, heart rate is 63, respirations 17, blood

pressure is 127/69, and oxygen saturation is 100% on 2 L nasal cannula. 

GENERAL:  He is a well-appearing elderly man, lying comfortably in bed. 

CARDIOVASCULAR:  Regular rate and rhythm. 

LUNGS:  Clear to auscultation. 

ABDOMEN:  Soft, nontender, and nondistended. 

EXTREMITIES:  No edema.

 

CARDIOVASCULAR MEDICATIONS:  Reviewed.

 

LABORATORY DATA:  Reviewed.

 

TELEMETRY:  Monitoring revealed normal sinus rhythm.

 

IMPRESSION:  

1. Paroxysmal atrial fibrillation, status post cardioversion.

2. Chronic kidney disease.

3. Anemia.

4. Hypertension.

 

RECOMMENDATIONS:  The patient underwent ROCCO guided cardioversion for restoration of

normal sinus rhythm.  Continue current cardiovascular medications including

anticoagulation and amiodarone. 

 

 

 

 

______________________________

Luis M Ferrari DO

 

BM/MODL

D:  10/02/2019 16:26:12

T:  10/02/2019 18:26:14

Job #:  [id number]

## 2019-10-02 NOTE — CONSULTATION
DATE OF CONSULTATION:  09/27/2019  

 

Consultation to Dr. Evelio Koch.

 

HISTORY OF PRESENT ILLNESS:  Sven Cabral is a 79-year-old white male, who is known to

me for more than 10 to 15 years.  The patient has been treated by me for cancer of the

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  The patient has remained in

complete remission.  The patient is also known to have rheumatoid arthritis, iron

deficiency anemia, anemia of chronic disease, for which he does get IV Infed. 

 

SOCIAL HISTORY:  Noncontributory.

 

FAMILY HISTORY:  Noncontributory.

 

ALLERGIES:  REPORTED NONE.

 

MEDICATIONS:  At this time:

1. Amiodarone.

2. Ceftriaxone.

3. Zithromax.

4. Metoprolol.

5. Pepcid.

6. Morphine.

 

REVIEW OF SYSTEMS:

HEENT:  Normal. 

CARDIAC:  Atrial fibrillation. 

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

deformities. 

:  History of cancer of the prostate, history of sarcoma of the scrotum. 

GI:  History of cancer of the colon.

 

PHYSICAL EXAMINATION:

GENERAL:  A rather thin built male, very anemic. 

NECK:  No palpable adenopathy. 

HEART:  Irregularly irregular. 

LUNGS:  Clear. 

ABDOMEN:  Soft. 

RECTAL:  Deferred. 

CENTRAL NERVOUS SYSTEM:  Normal. 

EXTREMITIES:  Shows swan neck deformity.

LABORATORY DATA:  Lab shows a sodium of 130, potassium 3.7, chloride is 96, CO2 28, BUN

31, creatinine 2.06, and glucose 100.  Hemoglobin of 7.5, hematocrit 24.8, white count

7100, and platelets 250,000.  Indices are suggestive of iron deficiency.  Bilirubin 0.6,

SGOT 20, SGPT 22, and alkaline phosphatase 99.  The patient's indices show an MCV of

80.8, however, the MCHC is very low at 29.5. 

 

IMPRESSION:  

1. Iron deficiency anemia.

2. Atrial fibrillation.

3. Cancer of the prostate, in complete remission.

4. Hypertension.

5. History of deep venous thrombosis of the legs.

6. Rheumatoid arthritis.

7. Sarcoma of the scrotum, in complete remission.

8. Chronic renal failure.

9. Hyponatremia of 130.

10. Hypokalemia of 3.4.

11. Hypoproteinemia.

12. Hypoalbuminemia of 2.7.

13. History of cancer of colon resected.

 

PLAN, COMMENTS, AND SUGGESTIONS:  I will confine myself to Hematology.  I will give him

Infed during this hospitalization. 

 

 

 

 

______________________________

MD ANGELA Caceres/THOR

D:  10/02/2019 09:34:25

T:  10/02/2019 15:19:13

Job #:  [id number]

## 2019-10-02 NOTE — NUR
WALKING ROUNDS PERFORMED, RECEIVED PT LAYING SEMI FOWLERS IN BED, AAOX3, RR EVEN AND 
NON-LABORED, ON ROOM AIR. NO S/SX OF DISTRESS NOTED. INCISION TO ANTERIOR HEAD NOTED TO BE 
INTACT, SUTURES INTACT. LEFT PT LAYING SEMI FOWLERS IN BED, BED IN LOW LOCKED POSITION, SIDE 
RAILS UPX2, CALL LIGHT AND PHONE WITHIN REACH.

## 2019-10-03 VITALS — SYSTOLIC BLOOD PRESSURE: 156 MMHG | DIASTOLIC BLOOD PRESSURE: 77 MMHG

## 2019-10-03 VITALS — SYSTOLIC BLOOD PRESSURE: 133 MMHG | DIASTOLIC BLOOD PRESSURE: 69 MMHG

## 2019-10-03 VITALS — SYSTOLIC BLOOD PRESSURE: 112 MMHG | DIASTOLIC BLOOD PRESSURE: 69 MMHG

## 2019-10-03 VITALS — SYSTOLIC BLOOD PRESSURE: 125 MMHG | DIASTOLIC BLOOD PRESSURE: 65 MMHG

## 2019-10-03 LAB
ANION GAP SERPL CALC-SCNC: 9.9 MMOL/L (ref 8–16)
BASOPHILS # BLD AUTO: 0.1 10*3/UL (ref 0–0.1)
BASOPHILS NFR BLD AUTO: 0.4 % (ref 0–1)
BUN SERPL-MCNC: 11 MG/DL (ref 7–26)
BUN/CREAT SERPL: 7 (ref 6–25)
CALCIUM SERPL-MCNC: 8.3 MG/DL (ref 8.4–10.2)
CHLORIDE SERPL-SCNC: 103 MMOL/L (ref 98–107)
CO2 SERPL-SCNC: 27 MMOL/L (ref 22–29)
DEPRECATED NEUTROPHILS # BLD AUTO: 8.6 10*3/UL (ref 2.1–6.9)
EGFRCR SERPLBLD CKD-EPI 2021: 43 ML/MIN (ref 60–?)
EOSINOPHIL # BLD AUTO: 0.3 10*3/UL (ref 0–0.4)
EOSINOPHIL NFR BLD AUTO: 2.7 % (ref 0–6)
EOSINOPHIL NFR BLD MANUAL: 2 % (ref 0–7)
ERYTHROCYTE [DISTWIDTH] IN CORD BLOOD: 17.2 % (ref 11.7–14.4)
GLUCOSE SERPLBLD-MCNC: 122 MG/DL (ref 74–118)
HCT VFR BLD AUTO: 29.7 % (ref 38.2–49.6)
HGB BLD-MCNC: 8.8 G/DL (ref 14–18)
LYMPHOCYTES # BLD: 1.4 10*3/UL (ref 1–3.2)
LYMPHOCYTES NFR BLD AUTO: 12.7 % (ref 18–39.1)
LYMPHOCYTES NFR BLD MANUAL: 13 % (ref 19–48)
MCH RBC QN AUTO: 24.6 PG (ref 28–32)
MCHC RBC AUTO-ENTMCNC: 29.6 G/DL (ref 31–35)
MCV RBC AUTO: 83.2 FL (ref 81–99)
MONOCYTES # BLD AUTO: 0.8 10*3/UL (ref 0.2–0.8)
MONOCYTES NFR BLD AUTO: 7.4 % (ref 4.4–11.3)
MONOCYTES NFR BLD MANUAL: 7 % (ref 3.4–9)
NEUTS SEG NFR BLD AUTO: 75.6 % (ref 38.7–80)
NEUTS SEG NFR BLD MANUAL: 78 % (ref 40–74)
PLATELET # BLD AUTO: 220 X10E3/UL (ref 140–360)
PLATELET # BLD EST: ADEQUATE 10*3/UL
POTASSIUM SERPL-SCNC: 4.9 MMOL/L (ref 3.5–5.1)
RBC # BLD AUTO: 3.57 X10E6/UL (ref 4.3–5.7)
RBC MORPH BLD: NORMAL
SODIUM SERPL-SCNC: 135 MMOL/L (ref 136–145)

## 2019-10-03 RX ADMIN — APIXABAN SCH MG: 5 TABLET, FILM COATED ORAL at 09:50

## 2019-10-03 RX ADMIN — HYDROMORPHONE HYDROCHLORIDE PRN MG: 2 INJECTION INTRAMUSCULAR; INTRAVENOUS; SUBCUTANEOUS at 01:31

## 2019-10-03 RX ADMIN — HYDROMORPHONE HYDROCHLORIDE PRN MG: 2 INJECTION INTRAMUSCULAR; INTRAVENOUS; SUBCUTANEOUS at 14:15

## 2019-10-03 RX ADMIN — FAMOTIDINE SCH MG: 10 INJECTION, SOLUTION INTRAVENOUS at 09:50

## 2019-10-03 RX ADMIN — AMIODARONE HYDROCHLORIDE SCH MG: 200 TABLET ORAL at 09:50

## 2019-10-03 RX ADMIN — HYDROMORPHONE HYDROCHLORIDE PRN MG: 2 INJECTION INTRAMUSCULAR; INTRAVENOUS; SUBCUTANEOUS at 10:22

## 2019-10-03 RX ADMIN — HYDROMORPHONE HYDROCHLORIDE PRN MG: 2 INJECTION INTRAMUSCULAR; INTRAVENOUS; SUBCUTANEOUS at 06:21

## 2019-10-03 NOTE — NUR
PATIENT ADDRESS WHERE SERVICE WILL BE RECEIVED: [address] , [facility] [id number]

PATIENT CONTACT NUMBER: [phone]

NAME OF HOME HEALTH COMPANY: [id number]

TELEPHONE NUMBER AND FAX NUMBER OF COMPANY: [phone], [phone]

SERVICES TO RECEIVE: [employer] NURSE EVAL [employer]RE[employer], PT/OT EVAL AND TREAT

ANTICIPATED DATE SERVICES WILL BEGIN : OCT 4TH 2019



EXPLAINED HOME HEALTH CARE AND CHOICE TO PATIENT. PATIENT SIGNED CHOICE LETTER AND I PLACED 
IN CHART



IMM EXPLAINED TO PT, SIGNED BY PATIENT, PLACED ON CHART



BOTH COPIES PLACED IN CARE TRANSITIONS FOLDER



RN CM CARD GIVEN FOR QUESTIONS OR CONCERNS

## 2019-10-03 NOTE — NUR
Left wrist IV discontinued. No signs of infiltration noted. 2x2 gauze and coban placed. 
Taken via wheelchair by PCT to personal car. Accompanied by wife. AAOX4 to time, person, 
place, situation. Respirations even and unlabored. Discharge instructions, rx, and all 
personal belongings taken with patient.